# Patient Record
Sex: FEMALE | Race: WHITE | NOT HISPANIC OR LATINO | Employment: FULL TIME | URBAN - METROPOLITAN AREA
[De-identification: names, ages, dates, MRNs, and addresses within clinical notes are randomized per-mention and may not be internally consistent; named-entity substitution may affect disease eponyms.]

---

## 2024-11-04 ENCOUNTER — ULTRASOUND (OUTPATIENT)
Dept: OBGYN CLINIC | Facility: CLINIC | Age: 34
End: 2024-11-04
Payer: COMMERCIAL

## 2024-11-04 VITALS
SYSTOLIC BLOOD PRESSURE: 124 MMHG | HEIGHT: 66 IN | WEIGHT: 206 LBS | BODY MASS INDEX: 33.11 KG/M2 | DIASTOLIC BLOOD PRESSURE: 72 MMHG

## 2024-11-04 DIAGNOSIS — Z32.01 POSITIVE PREGNANCY TEST: Primary | ICD-10-CM

## 2024-11-04 DIAGNOSIS — Z3A.10 10 WEEKS GESTATION OF PREGNANCY: ICD-10-CM

## 2024-11-04 PROCEDURE — 99203 OFFICE O/P NEW LOW 30 MIN: CPT | Performed by: OBSTETRICS & GYNECOLOGY

## 2024-11-04 PROCEDURE — 76817 TRANSVAGINAL US OBSTETRIC: CPT | Performed by: OBSTETRICS & GYNECOLOGY

## 2024-11-04 RX ORDER — ASPIRIN 81 MG/1
81 TABLET, CHEWABLE ORAL DAILY
COMMUNITY

## 2024-11-04 RX ORDER — DIPHENHYDRAMINE HYDROCHLORIDE 25 MG/1
25 CAPSULE ORAL 2 TIMES DAILY
COMMUNITY

## 2024-11-04 RX ORDER — LEVOTHYROXINE SODIUM 125 UG/1
TABLET ORAL
COMMUNITY

## 2024-11-04 NOTE — PROGRESS NOTES
"Pregnancy Confirmation Visit  [facility] - [address], [address], [place] [address]    Assessment/Plan:  34 y.o.  presenting with missed menses.  Viable pregnancy 10w2d by transfer date consistent with ultrasound today.  - Continue/start prenatal vitamin  - We reviewed her current medications and discussed which are safe to continue in pregnancy  - We briefly discussed options for aneuploidy screening, to be discussed further at the prenatal intake  - Schedule prenatal intake with RN and initial prenatal visit; prenatal labs will be ordered during the prenatal intake      Subjective:    CC: Missed period    Azra Mahoney is a 34 y.o.  who presents with missed menses.  No LMP recorded. Patient is pregnant.    Patient notes that this pregnancy was planned and desired.  She was not using contraception at the time of conception. She is an embryo transfer from IVF. She has has no vaginal bleeding since her LMP.    Objective:  /72 (BP Location: Right arm, Patient Position: Sitting, Cuff Size: Standard)   Ht 5' 6\" (1.676 m)   Wt 93.4 kg (206 lb)   BMI 33.25 kg/m²     Physical Exam:  General: Well appearing, no distress  CV: Regular rate  Respiratory: Unlabored breathing  Abdomen: Soft, nontender  Extremities: Without edema  Mood and Affect: Appropriate    Transvaginal Pelvic Ultrasound  [place] IUP  Yolk sac: Present  Fetal Pole: Present  CRL consistent with EGA   Cardiac activity: Present   bpm  No adnexal masses appreciated    "

## 2024-11-13 NOTE — PATIENT INSTRUCTIONS
Sumit Mahoney,     It was so nice getting to know you today. Remember if you have an urgent or time sensitive concern, please call the practice phone number so a clinical triage team member can review your symptoms and get in touch with our on call provider if necessary. If you have general questions or need help navigating our services please REPLY to this message so it comes directly to me and I will respond between other patients. If I am out of the office for any reason, another nurse navigator may reach out to help you. Our Pregnancy Essential Guide is a great online resource--please use the link below.     [facility] Pregnancy [facility]entials [facility]  [facility] ([email or web address])     Again, Congratulations and Thank You for choosing [facility]. I look forward to helping you through this journey.          Congratulations on your pregnancy!  We thank you for allowing us to participate in your care.    NEXT STEPS    Go to the lab to have your prenatal bloodwork competed if you have not already done so.  There is a listing of [facility] and locations in your prenatal folder. You may also visit [email or web address] or call [phone].   Please be aware that some insurance companies may require you to go to a specific lab ([facility] or [email or web address]). You can verify this by contacting your insurance company.   If you have decided to be screened for CF and SMA genetic testing, these tests require prior authorization and scheduling.  Prior Authorization is not a guarantee of payment. There may be out of pocket expenses that includes copays, deductibles and or coinsurance for your individual plan.  Please call [phone] if our team has not contacted you in 7 business days.  Please have your blood work completed prior to you next prenatal visit.    If you have decided to have genetic testing done at Maternal Fetal Medicine, that will be scheduled by [facility]. You may have already scheduled this appointment.  If  not, please call their office to schedule this appointment.  Based on the referral placed by our office, they will know how to schedule you appropriately.    Contact information for Maternal Fetal Medicine is located in your prenatal folder. The main phone number to their office is [phone].     Return to our office for your first routine prenatal visit.     Warning Signs During Pregnancy - If you experience any problems or concerns, call the office directly.  The list below includes warning signs your providers would like you to be aware of.  If you experience any of these at any time during your pregnancy, please call us as soon as possible.    Vaginal bleeding   Sharp abdominal pain that does not go away   Fever (more than 100.4?F and is not relieved with Tylenol)   Persistent vomiting lasting greater than 24 hours   Chest pain/Shortness of breath   Pain or burning when you urinate     Call the OFFICE [phone] for any questions/emergencies.  At night or on the weekend, calls go through a triage service, please indicate it is an emergency and the DOCTOR on call will be paged.    Remember to only use [employer] for non-urgent concerns or questions.    Our doctors deliver at [facility] in [place]. The address is provided below.     [facility]  [address]  [address]     Please click on the links below to review our Pregnancy Essential Guide.    [facility] Pregnancy Essentials Guide  [facility] Women[facility] ([email or web address])     [facility] - Virtual Tour ([employer])      To learn more about the Prenatal Education classes that [facility] offers, click the link below.  Prenatal Education Classes    Click on the link below to review [facility] Lab locations.  [facility] Lab Locations    [employer] resource  [employer] is a tool to connect you to community resources you may need.      Thank you,   Hussain SALAZAR RN  OB Nurse Navigator

## 2024-11-13 NOTE — PROGRESS NOTES
OB INTAKE INTERVIEW  Patient is 34 y.o. who presents for OB intake at 11.5 wks  She is accompanied by herself during this encounter  The father of her baby (Ab Mahoney) is involved in the pregnancy and is 37 years old.      Last Menstrual Period: 2024  Stopped OCP at age 28 and reports her menstrual cycles have been regular, every  30 days.   Ultrasound: showed a GS  5 weeks 2 days on 24  Ultrasound: Measured 7 weeks 1 days on 10/14/24, also noted to have a NUBIA measuring 12 x6x6 mm. Denied vaginal bleeding   Ultrasound: Measured 9 weeks 3 days on 10/28/24  Ultrasound: Measured 10 weeks 2 days on 24  Estimated Date of Delivery: 2025     Signs/Symptoms of Pregnancy  Current pregnancy symptoms:   Nausea: taking Vitamin B6 daily  Constipation no  Headaches no  Cramping/spotting YES, occasional  cramping, denies any spotting.  PICA cravings no    Diabetes-  There is no height or weight on file to calculate BMI.  If patient has 1 or more, please order early 1 hour GTT  History of GDM no  BMI >35 no  History of PCOS or current metformin use YES, not currently on  Metformin. Diagnosed at age 14.  History of LGA/macrosomic infant (4000g/9lbs) no    If patient has 2 or more, please order early 1 hour GTT  BMI>30 YES, BMI 33.25  AMA YES  First degree relative with type 2 diabetes no  History of chronic HTN, hyperlipidemia, elevated A1C no  High risk race (, , ,  or ) no    Hypertension- if you answer yes to any of the following, please order baseline preeclampsia labs (cbc, comprehensive metabolic panel, urine protein creatinine ratio, uric acid)  History of of chronic HTN no  History of gestational HTN no  History of preeclampsia, eclampsia, or HELLP syndrome YES, History of severe Preeclampsia/HELLP syndrome   History of diabetes no  History of lupus, autoimmune disease, kidney disease no    Thyroid- if yes order TSH with reflex  T4  History of thyroid disease YES, Hypothyroidism, currently on Levothyroxine 125 mcg daily, managed by [facility].   Last TSH 10/29/24-2.30    Bleeding Disorder or Hx of DVT-patient or first degree relative with history of. Order the following if not done previously.   (Factor V, antithrombin III, prothrombin gene mutation, protein C and S Ag, lupus anticoagulant, anticardiolipin, beta-2 glycoprotein)   YES, pt reports her father has been hospitalized with Pulmonary Embolism secondary to suspected  Atrial Fibrillation.  Please review and order thrombophilia panel if indicated.     OB/GYN-  History of abnormal pap smear no       Date of last pap smear No current pap on file-Reports she had a pap smear 2023 that was normal   History of HPV no  History of Herpes/HSV no  History of other STI (gonorrhea, chlamydia, trich) no  History of prior  no  History of prior  YES,   History of  delivery prior to 36 weeks 6 days YES, Pre-term  @ 33weeks.   History of blood transfusion no  Ok for blood transfusion  yes    Substance screening-   History of tobacco use no  Currently using tobacco no  Substance Use Screen Level No Risk     MRSA Screening-   Does the pt have a hx of MRSA? no    Immunizations:  Influenza vaccine given this season: Yes, 2024  Discussed Tdap vaccine yes  Discussed COVID Vaccine yes      Genetic/MFM-  Do you or your partner have a history of any of the following in yourselves or first degree relatives?  Cystic fibrosis no  Spinal muscular atrophy no  Hemoglobinopathy/Sickle Cell/Thalassemia no  Fragile X Intellectual Disability no    If yes, discuss Carrier Screening and recommend consultation with MFM/Genetic Counseling and place specific MFM Referral for.    If no, discuss Carrier Screening being completed once in a lifetime as a standard of care lab test. Place orders for Cystic Fibrosis Gene Test (NVD277) and Spinal Muscular Atrophy DNA  ([id number])      Appointment for Nuchal Translucency Ultrasound at [facility] scheduled for 11/25/24  *Reports she had carrier screening done with [facility]  was a carier for (Renal) disorder, pt was negative. No records were available at time of Intake. Pt will try to obtain a copy of results.   *Reports she had PGT testing on the embryo that was normal.    Interview education  [facility] Pregnancy Essentials Book reviewed, discussed and attached to their AVS yes    Nurse/Family Partnership- patient may qualify no  Ambulatory Referral to  placed  EPDS: 7    Prenatal lab work scripts YES  Preferred Lab: [facility]  Extra labs ordered:  Early 1 hour gtt   Uric Acid level    10/3/24-  *Baseline CMP, CBC, Urine prot/creat ration-All WNL, results are on the chart. Uric acid level not done (orders placed)   10/3/24-Hgb AIC-5.2   10/29/24-  TSH-2.3  T4 1.23    Aspirin/Preeclampsia Screen    Risk Level Risk Factor Recommendation   LOW Prior Uncomplicated full-term delivery no No Aspirin recommendation        MODERATE Nulliparity no Recommend low-dose aspirin if     BMI>30 YES 2 or more moderate risk factors    Family History Preeclampsia (mother/sister) YES, referred to as toxemia      35yr old or greater YES     Black Race, Concern for SDOH/Low Socioeconomic no     IVF Pregnancy  YES     Personal History Risks (low birth weight, prior adverse preg outcome, >10yr preg interval) YES         HIGH History of Preeclampsia YES, HELLP Syndrome Recommend low-dose aspirin if     Multifetal gestation no 1 or more high risk factors    Chronic HTN no     Type 1 or 2 Diabetes no     Renal Disease no     Autoimmune Disease  no      Contraindications to ASA therapy:  NSAID/ ASA allergy: no  Nasal polyps: no  Asthma with history of ASA induced bronchospasm: no  Relative contraindications:  History of GI bleed: no  Active peptic ulcer disease: no  Severe hepatic dysfunction: no    Patient should be recommended to take ASA 162mg  during this pregnancy from 12-36wks to lower her risk of preeclampsia:   High Risk -  Currently on 81 mg daily. Pt should be recommended to take ASA 162mg during this pregnancy from 12-36wks  Pt informed Dr. Nick discussed increasing ASA to 162 mg at 12 weeks   The patient has a history now or in prior pregnancy notable for:  See Below       Details that I feel the provider should be aware of:   Azra is a new patient to [facility].This is her third pregnancy. She is a transfer of care from [facility] after undergoing an  6 day Embryo transfer with IVF on 24.  Her pregnancy history includes on Pre-term delivery @ 33w via  section for severe preeclampsia and HELLP syndrome, IUGR and a miscarriage in 2023. Did not require surgical intervention.   Saw MFM with [facility] prior to IVF transfer, had to be cleared secondary to HELLP syndrome in prior pregnancy. baseline Pre-eclampsia labs done on 10/03/24, Uric acid not done, ordered with initial PN labs.    Delivery@ 33 weeks  LTCS  Preeclampsia and HELLP syndrome  AMA  History of PCOS Diagnosed at age 14, underwent Laparoscopy 2022-Confirmed PCOS and underwent Ovarian drilling.   History of hypothyroidism-Currently on Levothyroxine, Had been managed by [facility] during IVF cycle. TSH 10/29/24-2.30-will need a new order to have checked second trimester   Female infertility -transfer from [facility] post IVF  Family history of Pulmonary Embolism in father-please review if Thrombophilia panel is indicated      PN1 visit scheduled. The patient was oriented to our practice, the navigator role, reviewed delivering physicians and [facility]. All questions were answered.    Interviewed by: SILVESTRE Mckeon RN

## 2024-11-14 ENCOUNTER — INITIAL PRENATAL (OUTPATIENT)
Dept: OBGYN CLINIC | Facility: CLINIC | Age: 34
End: 2024-11-14

## 2024-11-14 VITALS
HEIGHT: 66 IN | WEIGHT: 206 LBS | DIASTOLIC BLOOD PRESSURE: 80 MMHG | BODY MASS INDEX: 33.11 KG/M2 | SYSTOLIC BLOOD PRESSURE: 120 MMHG

## 2024-11-14 DIAGNOSIS — E03.9 HYPOTHYROIDISM AFFECTING PREGNANCY IN FIRST TRIMESTER: ICD-10-CM

## 2024-11-14 DIAGNOSIS — O09.299 HX OF PREECLAMPSIA, PRIOR PREGNANCY, CURRENTLY PREGNANT: ICD-10-CM

## 2024-11-14 DIAGNOSIS — O99.281 HYPOTHYROIDISM AFFECTING PREGNANCY IN FIRST TRIMESTER: ICD-10-CM

## 2024-11-14 DIAGNOSIS — O09.299 H/O HELLP SYNDROME, CURRENTLY PREGNANT: ICD-10-CM

## 2024-11-14 DIAGNOSIS — Z36.89 ENCOUNTER FOR OTHER SPECIFIED ANTENATAL SCREENING: Primary | ICD-10-CM

## 2024-11-14 DIAGNOSIS — O99.211 OTHER OBESITY DUE TO EXCESS CALORIES AFFECTING PREGNANCY IN FIRST TRIMESTER: ICD-10-CM

## 2024-11-14 DIAGNOSIS — E66.09 OTHER OBESITY DUE TO EXCESS CALORIES AFFECTING PREGNANCY IN FIRST TRIMESTER: ICD-10-CM

## 2024-11-14 DIAGNOSIS — Z87.42 HISTORY OF PCOS: ICD-10-CM

## 2024-11-14 DIAGNOSIS — Z3A.11 11 WEEKS GESTATION OF PREGNANCY: ICD-10-CM

## 2024-11-18 ENCOUNTER — PATIENT OUTREACH (OUTPATIENT)
Dept: OBGYN CLINIC | Facility: CLINIC | Age: 34
End: 2024-11-18

## 2024-11-18 NOTE — PROGRESS NOTES
SW referred for initial prenatal assessment. Patient is , [id number] with an JULIANNE of 25. Patient agreeable to speaking with SW by phone today.    Patient reports this is a planned pregnancy (IVF). She and FOB ( Don) both work and drive. Patient denies needing information for MA/WIC/SNAP, parenting education, or baby supply resources today. She expressed interest in ACP info - [employer] sent via email.     Patient denies current or h/o substance use, DV/IPV, and legal concerns. Reports h/o CYS involvement related to her YURIY's kids - patient and FOB were listed on safety plan as support family members/kinship care options if YURIY's children needed to be removed from the home. Denies CYS involvement related to her own children.     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 Sees a therapist 1-2x/month at a maternal wellness center. Not currently on any mental health-related medication. Has felt anxious this pregnancy given h/o miscarriage, but states she feels physically better than she did in her last pregnancy. Has been taking extra time to rest when she needs to, and feels she is managing well. Has good support from FOB, family, and therapist. Enjoys napping, reading, and massages for stress relief.    No other SW needs identified at this time, SW closing referral. Please re-refer as needed.

## 2024-11-23 LAB
EXTERNAL ANTIBODY SCREEN: NORMAL
EXTERNAL HEMOGLOBIN: 13.2 G/DL
EXTERNAL HEPATITIS B SURFACE ANTIGEN: NEGATIVE
EXTERNAL HIV-1 AB: NEGATIVE
EXTERNAL HIV-1 P24 ANTIGEN: NEGATIVE
EXTERNAL HIV-2 AB: NEGATIVE
EXTERNAL PLATELET COUNT: 231 K/ÂΜL
EXTERNAL RH FACTOR: POSITIVE
EXTERNAL RUBELLA IGG QUANTITATION: NORMAL
EXTERNAL SYPHILIS TOTAL IGG/IGM SCREENING: NEGATIVE

## 2024-11-24 LAB
BASOPHILS # BLD AUTO: 18 CELLS/UL (ref 0–200)
BASOPHILS NFR BLD AUTO: 0.3 %
EOSINOPHIL # BLD AUTO: 79 CELLS/UL (ref 15–500)
EOSINOPHIL NFR BLD AUTO: 1.3 %
ERYTHROCYTE [DISTWIDTH] IN BLOOD BY AUTOMATED COUNT: 12.9 % (ref 11–15)
GLUCOSE 1H P 50 G GLC PO SERPL-MCNC: 57 MG/DL
HBV SURFACE AG SERPL QL IA: NORMAL
HCT VFR BLD AUTO: 39.5 % (ref 35–45)
HCV AB SERPL QL IA: NORMAL
HGB BLD-MCNC: 13.2 G/DL (ref 11.7–15.5)
HIV 1+2 AB+HIV1 P24 AG SERPL QL IA: NORMAL
LYMPHOCYTES # BLD AUTO: 1488 CELLS/UL (ref 850–3900)
LYMPHOCYTES NFR BLD AUTO: 24.4 %
MCH RBC QN AUTO: 29.8 PG (ref 27–33)
MCHC RBC AUTO-ENTMCNC: 33.4 G/DL (ref 32–36)
MCV RBC AUTO: 89.2 FL (ref 80–100)
MONOCYTES # BLD AUTO: 482 CELLS/UL (ref 200–950)
MONOCYTES NFR BLD AUTO: 7.9 %
NEUTROPHILS # BLD AUTO: 4032 CELLS/UL (ref 1500–7800)
NEUTROPHILS NFR BLD AUTO: 66.1 %
PLATELET # BLD AUTO: 231 THOUSAND/UL (ref 140–400)
PMV BLD REES-ECKER: 11.7 FL (ref 7.5–12.5)
RBC # BLD AUTO: 4.43 MILLION/UL (ref 3.8–5.1)
RUBV IGG SERPL IA-ACNC: 3.04 INDEX
URATE SERPL-MCNC: 4.5 MG/DL (ref 2.5–7)
WBC # BLD AUTO: 6.1 THOUSAND/UL (ref 3.8–10.8)

## 2024-11-25 ENCOUNTER — TELEPHONE (OUTPATIENT)
Age: 34
End: 2024-11-25

## 2024-11-25 ENCOUNTER — ROUTINE PRENATAL (OUTPATIENT)
Dept: PERINATAL CARE | Facility: CLINIC | Age: 34
End: 2024-11-25
Payer: COMMERCIAL

## 2024-11-25 VITALS
WEIGHT: 209.6 LBS | HEIGHT: 66 IN | SYSTOLIC BLOOD PRESSURE: 122 MMHG | HEART RATE: 98 BPM | BODY MASS INDEX: 33.68 KG/M2 | OXYGEN SATURATION: 99 % | DIASTOLIC BLOOD PRESSURE: 80 MMHG

## 2024-11-25 DIAGNOSIS — O09.299 HISTORY OF HELLP SYNDROME, CURRENTLY PREGNANT: ICD-10-CM

## 2024-11-25 DIAGNOSIS — O99.281 HYPOTHYROIDISM AFFECTING PREGNANCY IN FIRST TRIMESTER: ICD-10-CM

## 2024-11-25 DIAGNOSIS — Z82.49 FAMILY HISTORY OF THROMBOSIS: ICD-10-CM

## 2024-11-25 DIAGNOSIS — O09.811 PREGNANCY RESULTING FROM ASSISTED REPRODUCTIVE TECHNOLOGY IN FIRST TRIMESTER: ICD-10-CM

## 2024-11-25 DIAGNOSIS — Z98.891 HISTORY OF CESAREAN SECTION: ICD-10-CM

## 2024-11-25 DIAGNOSIS — E03.9 HYPOTHYROIDISM AFFECTING PREGNANCY IN FIRST TRIMESTER: ICD-10-CM

## 2024-11-25 DIAGNOSIS — E66.811 OBESITY, CLASS I, BMI 30-34.9: ICD-10-CM

## 2024-11-25 DIAGNOSIS — Z3A.13 13 WEEKS GESTATION OF PREGNANCY: ICD-10-CM

## 2024-11-25 DIAGNOSIS — Z36.82 NUCHAL TRANSLUCENCY OF FETUS ON PRENATAL ULTRASOUND: Primary | ICD-10-CM

## 2024-11-25 DIAGNOSIS — Z36.0 ENCOUNTER FOR ANTENATAL SCREENING FOR CHROMOSOMAL ANOMALIES: ICD-10-CM

## 2024-11-25 DIAGNOSIS — O09.521 ADVANCED MATERNAL AGE IN MULTIGRAVIDA, FIRST TRIMESTER: ICD-10-CM

## 2024-11-25 PROCEDURE — 76813 OB US NUCHAL MEAS 1 GEST: CPT | Performed by: OBSTETRICS & GYNECOLOGY

## 2024-11-25 PROCEDURE — 76801 OB US < 14 WKS SINGLE FETUS: CPT | Performed by: OBSTETRICS & GYNECOLOGY

## 2024-11-25 PROCEDURE — 99244 OFF/OP CNSLTJ NEW/EST MOD 40: CPT | Performed by: OBSTETRICS & GYNECOLOGY

## 2024-11-25 NOTE — TELEPHONE ENCOUNTER
Patient calling in stating taht she's 13w2d , pt had blood work completed and pt is concerned about her glucose testing result. Pt would like recommendations. pt was notified that the provider hasn't yet reviewed the results, once the provider reviews the results and gives further information, the patient will be notified, pt verbalized understanding, no further questions at this time.

## 2024-11-25 NOTE — PROGRESS NOTES
A fetal ultrasound was completed. See Ob procedures in Epic for an interpretation and recommendations. Do not hesitate to contact us in [facility] if you have questions.    Horace Morelos MD, MSCE  Maternal Fetal Medicine

## 2024-11-25 NOTE — LETTER
November 25, 2024     Maggie Nick V,   [address]  [address]  [place] [address]    Patient: Azra Mahoney   YOB: 1990   Date of Visit: 11/25/2024       Dear Dr. Nick:    Thank you for referring Azra Mahoney to me for evaluation. Below are my notes for this consultation.    If you have questions, please do not hesitate to call me. I look forward to following your patient along with you.         Sincerely,        Horace Morelos MD        CC: No Recipients    Horace Morelos MD  11/25/2024  4:07 PM  Sign when Signing Visit  A fetal ultrasound was completed. See Ob procedures in Epic for an interpretation and recommendations. Do not hesitate to contact us in [facility] if you have questions.    Horace Morelos MD, MSCE  Maternal Fetal Medicine

## 2024-11-25 NOTE — PROGRESS NOTES
Ultrasound Probe Disinfection    A transvaginal ultrasound was performed.   Prior to use, disinfection was performed with High Level Disinfection Process ([employer]).  Probe serial number [id number] was used.    Yessica Bucio  11/25/24  1:41 PM

## 2024-11-26 LAB
ABO GROUP BLD: NORMAL
BASOPHILS # BLD AUTO: 18 CELLS/UL (ref 0–200)
BASOPHILS NFR BLD AUTO: 0.3 %
BLD GP AB SCN SERPL QL: NORMAL
EOSINOPHIL # BLD AUTO: 79 CELLS/UL (ref 15–500)
EOSINOPHIL NFR BLD AUTO: 1.3 %
ERYTHROCYTE [DISTWIDTH] IN BLOOD BY AUTOMATED COUNT: 12.9 % (ref 11–15)
GLUCOSE 1H P 50 G GLC PO SERPL-MCNC: 57 MG/DL
HBV SURFACE AG SERPL QL IA: NORMAL
HCT VFR BLD AUTO: 39.5 % (ref 35–45)
HCV AB SERPL QL IA: NORMAL
HGB BLD-MCNC: 13.2 G/DL (ref 11.7–15.5)
HIV 1+2 AB+HIV1 P24 AG SERPL QL IA: NORMAL
LYMPHOCYTES # BLD AUTO: 1488 CELLS/UL (ref 850–3900)
LYMPHOCYTES NFR BLD AUTO: 24.4 %
MCH RBC QN AUTO: 29.8 PG (ref 27–33)
MCHC RBC AUTO-ENTMCNC: 33.4 G/DL (ref 32–36)
MCV RBC AUTO: 89.2 FL (ref 80–100)
MONOCYTES # BLD AUTO: 482 CELLS/UL (ref 200–950)
MONOCYTES NFR BLD AUTO: 7.9 %
NEUTROPHILS # BLD AUTO: 4032 CELLS/UL (ref 1500–7800)
NEUTROPHILS NFR BLD AUTO: 66.1 %
PLATELET # BLD AUTO: 231 THOUSAND/UL (ref 140–400)
PMV BLD REES-ECKER: 11.7 FL (ref 7.5–12.5)
RBC # BLD AUTO: 4.43 MILLION/UL (ref 3.8–5.1)
RH BLD: NORMAL
RPR SER QL: NORMAL
RUBV IGG SERPL IA-ACNC: 3.04 INDEX
URATE SERPL-MCNC: 4.5 MG/DL (ref 2.5–7)
WBC # BLD AUTO: 6.1 THOUSAND/UL (ref 3.8–10.8)

## 2024-12-02 ENCOUNTER — INITIAL PRENATAL (OUTPATIENT)
Dept: OBGYN CLINIC | Facility: CLINIC | Age: 34
End: 2024-12-02

## 2024-12-02 VITALS
HEIGHT: 66 IN | DIASTOLIC BLOOD PRESSURE: 82 MMHG | BODY MASS INDEX: 33.65 KG/M2 | SYSTOLIC BLOOD PRESSURE: 118 MMHG | WEIGHT: 209.4 LBS

## 2024-12-02 DIAGNOSIS — E03.9 HYPOTHYROIDISM AFFECTING PREGNANCY IN SECOND TRIMESTER: ICD-10-CM

## 2024-12-02 DIAGNOSIS — O34.211 MATERNAL CARE DUE TO LOW TRANSVERSE UTERINE SCAR FROM PREVIOUS CESAREAN DELIVERY: ICD-10-CM

## 2024-12-02 DIAGNOSIS — Z12.4 SCREENING FOR MALIGNANT NEOPLASM OF THE CERVIX: ICD-10-CM

## 2024-12-02 DIAGNOSIS — O09.812 PREGNANCY RESULTING FROM ASSISTED REPRODUCTIVE TECHNOLOGY IN SECOND TRIMESTER: Primary | ICD-10-CM

## 2024-12-02 DIAGNOSIS — Z3A.14 14 WEEKS GESTATION OF PREGNANCY: ICD-10-CM

## 2024-12-02 DIAGNOSIS — O99.282 HYPOTHYROIDISM AFFECTING PREGNANCY IN SECOND TRIMESTER: ICD-10-CM

## 2024-12-02 DIAGNOSIS — O09.299 HISTORY OF HELLP SYNDROME, CURRENTLY PREGNANT: ICD-10-CM

## 2024-12-02 LAB
EXTERNAL CHLAMYDIA SCREEN: NEGATIVE
EXTERNAL GONORRHEA SCREEN: NEGATIVE

## 2024-12-02 PROCEDURE — PNV: Performed by: OBSTETRICS & GYNECOLOGY

## 2024-12-02 NOTE — PROGRESS NOTES
"Routine Prenatal Visit  [facility] - [place] [id number] [place] [address]    Assessment/Plan:  Azra is a 34 y.o. year old  at 14w2d who presents for routine prenatal visit.     1. Pregnancy resulting from assisted reproductive technology in second trimester  2. Maternal care due to low transverse uterine scar from previous  delivery  3. History of HELLP syndrome, currently pregnant  4. Hypothyroidism affecting pregnancy in second trimester  5. 14 weeks gestation of pregnancy  6. Screening for malignant neoplasm of the cervix  -     Thinprep Tis Pap and HPV mRNA E6/E7, Chlamydia/N.gonorrhoeae        Subjective:     CC: Prenatal care    Azra Mahoney is a 34 y.o.  female who presents for routine prenatal care at 14w2d.  Pregnancy ROS: no leakage of fluid, pelvic pain, or vaginal bleeding.  no fetal movement.    The following portions of the patient's history were reviewed and updated as appropriate: allergies, current medications, past family history, past medical history, obstetric history, gynecologic history, past social history, past surgical history and problem list.      Objective:  /82 (BP Location: Left arm, Patient Position: Sitting, Cuff Size: Standard)   Ht 5' 6\" (1.676 m)   Wt 95 kg (209 lb 6.4 oz)   BMI 33.80 kg/m²   Pregravid Weight/BMI: 90.7 kg (200 lb) (BMI 32.30)  Current Weight: 95 kg (209 lb 6.4 oz)   Total Weight Gain: 4.264 kg (9 lb 6.4 oz)   Pre-Kit Vitals      Flowsheet Row Most Recent Value   Prenatal Assessment    Fetal Heart Rate 160   Fundal Height (cm) 14 cm   Prenatal Vitals    Blood Pressure 118/82   Weight - Scale 95 kg (209 lb 6.4 oz)   Urine Albumin/Glucose    Dilation/Effacement/Station    Vaginal Drainage    Edema              General: Well appearing, no distress  Respiratory: Unlabored breathing  Cardiovascular: Regular rate.  Abdomen: Soft, gravid, nontender  Fundal Height: Appropriate for gestational age.  Extremities: Warm and " well perfused.  Non tender.

## 2024-12-04 PROBLEM — O09.812 PREGNANCY RESULTING FROM ASSISTED REPRODUCTIVE TECHNOLOGY IN SECOND TRIMESTER: Status: ACTIVE | Noted: 2024-11-25

## 2024-12-05 LAB
C TRACH RRNA SPEC QL NAA+PROBE: NOT DETECTED
CLINICAL INFO: NORMAL
CYTO CVX: NORMAL
CYTOLOGY CMNT CVX/VAG CYTO-IMP: NORMAL
DATE PREVIOUS BX: NORMAL
HPV E6+E7 MRNA CVX QL NAA+PROBE: NOT DETECTED
LMP START DATE: NORMAL
N GONORRHOEA RRNA SPEC QL NAA+PROBE: NOT DETECTED
SL AMB PREV. PAP:: NORMAL
SPECIMEN SOURCE CVX/VAG CYTO: NORMAL

## 2024-12-11 PROBLEM — O34.211 MATERNAL CARE DUE TO LOW TRANSVERSE UTERINE SCAR FROM PREVIOUS CESAREAN DELIVERY: Status: ACTIVE | Noted: 2024-12-11

## 2024-12-11 PROBLEM — O99.282 HYPOTHYROIDISM AFFECTING PREGNANCY IN SECOND TRIMESTER: Status: ACTIVE | Noted: 2024-11-25

## 2024-12-24 ENCOUNTER — RESULTS FOLLOW-UP (OUTPATIENT)
Dept: OBGYN CLINIC | Facility: CLINIC | Age: 34
End: 2024-12-24

## 2024-12-30 ENCOUNTER — ROUTINE PRENATAL (OUTPATIENT)
Dept: OBGYN CLINIC | Facility: CLINIC | Age: 34
End: 2024-12-30
Payer: COMMERCIAL

## 2024-12-30 ENCOUNTER — TELEPHONE (OUTPATIENT)
Dept: OBGYN CLINIC | Facility: CLINIC | Age: 34
End: 2024-12-30

## 2024-12-30 VITALS — SYSTOLIC BLOOD PRESSURE: 120 MMHG | DIASTOLIC BLOOD PRESSURE: 70 MMHG | BODY MASS INDEX: 34.48 KG/M2 | WEIGHT: 213.6 LBS

## 2024-12-30 DIAGNOSIS — O99.282 HYPOTHYROIDISM AFFECTING PREGNANCY IN SECOND TRIMESTER: ICD-10-CM

## 2024-12-30 DIAGNOSIS — Z36.1 ANTENATAL SCREENING FOR RAISED ALPHAFETOPROTEIN LEVEL: ICD-10-CM

## 2024-12-30 DIAGNOSIS — E66.09 OTHER OBESITY DUE TO EXCESS CALORIES AFFECTING PREGNANCY IN SECOND TRIMESTER: ICD-10-CM

## 2024-12-30 DIAGNOSIS — Z3A.18 18 WEEKS GESTATION OF PREGNANCY: Primary | ICD-10-CM

## 2024-12-30 DIAGNOSIS — E03.9 HYPOTHYROIDISM AFFECTING PREGNANCY IN SECOND TRIMESTER: ICD-10-CM

## 2024-12-30 DIAGNOSIS — O34.211 MATERNAL CARE DUE TO LOW TRANSVERSE UTERINE SCAR FROM PREVIOUS CESAREAN DELIVERY: ICD-10-CM

## 2024-12-30 DIAGNOSIS — O99.212 OTHER OBESITY DUE TO EXCESS CALORIES AFFECTING PREGNANCY IN SECOND TRIMESTER: ICD-10-CM

## 2024-12-30 DIAGNOSIS — O09.299 HISTORY OF HELLP SYNDROME, CURRENTLY PREGNANT: ICD-10-CM

## 2024-12-30 DIAGNOSIS — Z36.89 ENCOUNTER FOR OTHER SPECIFIED ANTENATAL SCREENING: ICD-10-CM

## 2024-12-30 DIAGNOSIS — O09.522 MULTIGRAVIDA OF ADVANCED MATERNAL AGE IN SECOND TRIMESTER: ICD-10-CM

## 2024-12-30 DIAGNOSIS — O09.812 PREGNANCY RESULTING FROM IN VITRO FERTILIZATION IN SECOND TRIMESTER: ICD-10-CM

## 2024-12-30 DIAGNOSIS — O09.299 HISTORY OF INTRAUTERINE GROWTH RESTRICTION IN PRIOR PREGNANCY, CURRENTLY PREGNANT: ICD-10-CM

## 2024-12-30 PROBLEM — O99.280 HYPOTHYROIDISM COMPLICATING PREGNANCY: Status: ACTIVE | Noted: 2024-11-25

## 2024-12-30 PROBLEM — O09.529 ADVANCED MATERNAL AGE IN MULTIGRAVIDA: Status: ACTIVE | Noted: 2024-11-25

## 2024-12-30 PROBLEM — O09.819 PREGNANCY RESULTING FROM ASSISTED REPRODUCTIVE TECHNOLOGY: Status: ACTIVE | Noted: 2024-11-25

## 2024-12-30 PROBLEM — O99.210 OBESITY AFFECTING PREGNANCY: Status: ACTIVE | Noted: 2024-12-30

## 2024-12-30 LAB
SL AMB  POCT GLUCOSE, UA: NEGATIVE
SL AMB POCT URINE PROTEIN: NEGATIVE

## 2024-12-30 PROCEDURE — PNV: Performed by: STUDENT IN AN ORGANIZED HEALTH CARE EDUCATION/TRAINING PROGRAM

## 2024-12-30 PROCEDURE — 81002 URINALYSIS NONAUTO W/O SCOPE: CPT | Performed by: STUDENT IN AN ORGANIZED HEALTH CARE EDUCATION/TRAINING PROGRAM

## 2024-12-30 NOTE — PROGRESS NOTES
Routine Prenatal Visit  [facility] - [place]  [address], [place] [address]  Assessment & Plan  Pregnancy resulting from in vitro fertilization in second trimester  - Plan for fetal echo, 3rd trimester growth US, and weekly APFS beginning at 36 weeks.   - Contine  mg PO daily until 36 weeks for pre-eclampsia risk reduction.        Maternal care due to low transverse uterine scar from previous  delivery  - Discussed mode of delivery. Patient is undecided. She indicates that this is most likely her final pregnancy. Will continue to consider and discuss at subsequent visit.        Hypothyroidism affecting pregnancy in second trimester  - Most recent TSH . Continue levothyroxine as managed by [facility].   - Discussed recommendation for serial TSH every 6 weeks. Patient to send results via [employer].        History of HELLP syndrome, currently pregnant  - Contine  mg PO daily until 36 weeks for pre-eclampsia risk reduction.          History of intrauterine growth restriction in prior pregnancy, currently pregnant  - Plan for serial growth ultrasound.       18 weeks gestation of pregnancy  - Continue prenatal vitamin with folic acid.  - Aneuploidy screening: PGS negative. Declines NIPS.  - Screening for open neural tube defects reviewed. Order for maternal serum alpha-fetoprotein provided today.  - Level II anatomy ultrasound scheduled with Maternal Fetal Medicine.  - Problem list updated, results console reviewed and updated with pertinent prenatal labs.  - PMH, PSH, medications reviewed and updated as needed.  - Return to office in 4 wk(s) for routine prenatal care.    Orders:  •  POCT urine dip    Multigravida of advanced maternal age in second trimester         Other obesity due to excess calories affecting pregnancy in second trimester          screening for raised alphafetoprotein level    Orders:  •  Alpha fetoprotein, maternal; Future    Encounter for  other specified  screening    Orders:  •  Alpha fetoprotein, maternal; Future    Subjective:   Azra Mahoney is a 34 y.o.  who presents for routine prenatal care at 18w2d.  Complaints today: None  LOF: No; VB: No; Contractions: No; FM: Flutters    Objective:  /70   Wt 96.9 kg (213 lb 9.6 oz)   BMI 34.48 kg/m²     General: Well appearing, no distress  Respiratory: Unlabored breathing  Cardiovascular: Regular rate.  Abdomen: Soft, gravid, nontender  Extremities: Warm and well perfused.  Non tender.    Pregravid Weight/BMI: 90.7 kg (200 lb) (BMI 32.30)  Current Weight: 96.9 kg (213 lb 9.6 oz)   Total Weight Gain: 6.169 kg (13 lb 9.6 oz)     Pre- Vitals    Flowsheet Row Most Recent Value   Prenatal Assessment    Fetal Heart Rate 150   Fundal Height (cm) 18 cm   Movement Present   Prenatal Vitals    Blood Pressure 120/70   Weight - Scale 96.9 kg (213 lb 9.6 oz)   Urine Albumin/Glucose    Dilation/Effacement/Station    Vaginal Drainage    Draining Fluid No   Edema    LLE Edema None   RLE Edema None   Facial Edema None           Chacorta Lewis MD  2024 4:05 PM

## 2024-12-30 NOTE — ASSESSMENT & PLAN NOTE
- Continue prenatal vitamin with folic acid.  - Aneuploidy screening: PGS negative. Declines NIPS.  - Screening for open neural tube defects reviewed. Order for maternal serum alpha-fetoprotein provided today.  - Level II anatomy ultrasound scheduled with Maternal Fetal Medicine.  - Problem list updated, results console reviewed and updated with pertinent prenatal labs.  - PMH, PSH, medications reviewed and updated as needed.  - Return to office in 4 wk(s) for routine prenatal care.    Orders:  •  POCT urine dip

## 2024-12-30 NOTE — ASSESSMENT & PLAN NOTE
- Most recent TSH . Continue levothyroxine as managed by [facility].   - Discussed recommendation for serial TSH every 6 weeks. Patient to send results via [employer].

## 2024-12-30 NOTE — ASSESSMENT & PLAN NOTE
- Plan for fetal echo, 3rd trimester growth US, and weekly APFS beginning at 36 weeks.   - Contine  mg PO daily until 36 weeks for pre-eclampsia risk reduction.

## 2024-12-30 NOTE — ASSESSMENT & PLAN NOTE
- Discussed mode of delivery. Patient is undecided. She indicates that this is most likely her final pregnancy. Will continue to consider and discuss at subsequent visit.

## 2024-12-30 NOTE — TELEPHONE ENCOUNTER
Second Trimester Calls:    Overall how are you doing? Overall doing well.     Compliant with routine OB care appointments? Yes    Have you completed your 1st trimester labs? Yes    If you had NIPS with MFM, do you have a order for MSAFP? Order will be provided today at appointment 12/30/24   Can be completed 15w-22w6d, ideally 16w-18w    Have you seen MFM and do you have your detailed US scheduled? Scheduled 1/20/25    Pregnancy Education-have you had a chance to review the classes offered and registered? Pt states she did not had a chance to look at the classes offered yet but will look into it.

## 2025-01-06 ENCOUNTER — NURSE TRIAGE (OUTPATIENT)
Dept: OTHER | Facility: OTHER | Age: 35
End: 2025-01-06

## 2025-01-07 ENCOUNTER — TELEPHONE (OUTPATIENT)
Dept: OBGYN CLINIC | Facility: CLINIC | Age: 35
End: 2025-01-07

## 2025-01-07 NOTE — TELEPHONE ENCOUNTER
"Regardin wks preg / cold symptoms / chills / temp 101 / body aches  ----- Message from Benita CESPEDES sent at 2025  7:08 PM EST -----  \"I am 19 weeks pregnant and woke this morning with cold symptoms like sore throat and stuffy, but now I have the chills and body aches all over and a temp of 101. I don't know if I should just take Tylenol\"    "

## 2025-01-07 NOTE — TELEPHONE ENCOUNTER
"Reason for Disposition  • Fever 100.4 F (38.0 C) or higher  • Common cold with no complications    Answer Assessment - Initial Assessment Questions  1. TEMPERATURE: \"What is the most recent temperature?\"  \"How was it measured?\"       101 (tympanic) fever started today in the last 10 minutes    2. ONSET: \"When did the fever start?\"       Today since this morning.     3. CHILLS: \"Do you have chills?\" If Yes, ask: \"How bad are they?\"  (e.g., none, mild, moderate, severe)      No chills.     4. CAUSE: If there are no symptoms, ask: \"What do you think is causing the fever?\"       Cold    5. CONTACTS: \"Does anyone else in the family have an infection?\"      Pts son is getting over being sick with URI.     6. TREATMENT: \"What have you done so far to treat this fever?\" (e.g., medicines)      Took 500 mg of Tylenol LD: took about 6 hrs ago.     7. IMMUNOCOMPROMISE: \"Do you have of the following: diabetes, HIV positive, splenectomy, chronic steroid treatment, transplant patient, etc.\"      No    8. OTHER SYMPTOMS: \"Do you have any other symptoms?\" (e.g., abdomen pain, cough, decreased fetal movement, diarrhea, headache, leaking fluid or concern water broke, sore throat, urination pain, vaginal bleeding)      Stuffy nose, sore throat, headache, chills and shivering. Mild occasional cough. Entire body hurts. No belly pain, vaginal bleeding, or fluid leakage, no change in FM.      9. JULIANNE: \"What date are you expecting to deliver?      5/31/25    10. TRAVEL: \"Have you traveled out of the country in the last month?\" (e.g., travel history, exposures)        No recent travel.    Protocols used: Pregnancy - Fever-Adult-AH, Common Cold-Adult-AH    "

## 2025-01-07 NOTE — TELEPHONE ENCOUNTER
OB - 19 wks 3 days - started with sore throat & congestion on 1/6/2024 - had called in last night thru answering service - had temp 101.0, body aches.  Was taking Tylenol 500 mg q 4 hrs from 7 pm last night until 10 am this morning.  (+) Covid test.  Temp now 100.1, feels some improvement of symptoms today.  Denies SOB.  Spoke with SELENA Bauer re: patient's sx - recommend to continue Tylenol (not to exceed 3000 mg/day), increase hydration, recall office in 1-2 days to update or prn if worsening sx.  Patient informed.

## 2025-01-09 ENCOUNTER — PATIENT MESSAGE (OUTPATIENT)
Dept: OBGYN CLINIC | Facility: CLINIC | Age: 35
End: 2025-01-09

## 2025-01-09 DIAGNOSIS — E03.9 HYPOTHYROIDISM AFFECTING PREGNANCY IN SECOND TRIMESTER: Primary | ICD-10-CM

## 2025-01-09 DIAGNOSIS — O99.282 HYPOTHYROIDISM AFFECTING PREGNANCY IN SECOND TRIMESTER: Primary | ICD-10-CM

## 2025-01-10 NOTE — TELEPHONE ENCOUNTER
Follow-up call placed, voicemail received, left a message inquiring how she is feeling, call back if symptoms have not improved or need for follow-up by PCP.

## 2025-01-11 ENCOUNTER — RESULTS FOLLOW-UP (OUTPATIENT)
Dept: LABOR AND DELIVERY | Facility: HOSPITAL | Age: 35
End: 2025-01-11

## 2025-01-11 LAB — TSH SERPL-ACNC: 2.4 MIU/L

## 2025-01-13 ENCOUNTER — RESULTS FOLLOW-UP (OUTPATIENT)
Dept: OBGYN CLINIC | Facility: CLINIC | Age: 35
End: 2025-01-13

## 2025-01-13 LAB
# FETUSES US: 1
AFP ADJ MOM SERPL: 0.92
AFP INTERP SERPL-IMP: NORMAL
AFP SERPL-MCNC: 42.4 NG/ML
AGE: NORMAL
DONATED EGG PATIENT QL: NO
GA CLIN EST: 19.9 WEEKS
GA METHOD: NORMAL
HX OF NTD NARR: NO
HX OF TRISOMY 21 NARR: NO
IDDM PATIENT QL: NO
NEURAL TUBE DEFECT RISK FETUS: NORMAL %
SL AMB REPEAT SPECIMEN: NO

## 2025-01-20 ENCOUNTER — TELEPHONE (OUTPATIENT)
Age: 35
End: 2025-01-20

## 2025-01-20 ENCOUNTER — TELEPHONE (OUTPATIENT)
Facility: HOSPITAL | Age: 35
End: 2025-01-20

## 2025-01-20 NOTE — TELEPHONE ENCOUNTER
Pt unaware the office was opening late until this morning, almost to appt. Advised she would be contacted to nury. No appts avail and no one in office until later today.

## 2025-01-20 NOTE — TELEPHONE ENCOUNTER
Left voicemail requesting patient call our office back at [phone] so that we may reschedule her ultrasound appointment. Informed patient she may ask for me when calling back.

## 2025-01-23 NOTE — PROGRESS NOTES
Please refer to the [facility] ultrasound report in Ob Procedures for additional information regarding today's visit

## 2025-01-25 ENCOUNTER — ROUTINE PRENATAL (OUTPATIENT)
Facility: HOSPITAL | Age: 35
End: 2025-01-25
Attending: OBSTETRICS & GYNECOLOGY
Payer: COMMERCIAL

## 2025-01-25 VITALS
HEIGHT: 66 IN | HEART RATE: 93 BPM | SYSTOLIC BLOOD PRESSURE: 110 MMHG | WEIGHT: 216.49 LBS | BODY MASS INDEX: 34.79 KG/M2 | DIASTOLIC BLOOD PRESSURE: 60 MMHG

## 2025-01-25 DIAGNOSIS — E03.9 HYPOTHYROIDISM DURING PREGNANCY IN SECOND TRIMESTER: ICD-10-CM

## 2025-01-25 DIAGNOSIS — Z3A.22 22 WEEKS GESTATION OF PREGNANCY: Primary | ICD-10-CM

## 2025-01-25 DIAGNOSIS — Z36.86 ENCOUNTER FOR ANTENATAL SCREENING FOR CERVICAL LENGTH: ICD-10-CM

## 2025-01-25 DIAGNOSIS — O09.812 PREGNANCY RESULTING FROM IN VITRO FERTILIZATION IN SECOND TRIMESTER: ICD-10-CM

## 2025-01-25 DIAGNOSIS — O09.522 ELDERLY MULTIGRAVIDA, SECOND TRIMESTER: ICD-10-CM

## 2025-01-25 DIAGNOSIS — O09.292 HX OF PREECLAMPSIA, PRIOR PREGNANCY, CURRENTLY PREGNANT, SECOND TRIMESTER: ICD-10-CM

## 2025-01-25 DIAGNOSIS — O99.282 HYPOTHYROIDISM DURING PREGNANCY IN SECOND TRIMESTER: ICD-10-CM

## 2025-01-25 DIAGNOSIS — O09.292 HISTORY OF INTRAUTERINE GROWTH RESTRICTION IN PRIOR PREGNANCY, CURRENTLY PREGNANT, SECOND TRIMESTER: ICD-10-CM

## 2025-01-25 PROCEDURE — 76817 TRANSVAGINAL US OBSTETRIC: CPT | Performed by: OBSTETRICS & GYNECOLOGY

## 2025-01-25 PROCEDURE — 76811 OB US DETAILED SNGL FETUS: CPT | Performed by: OBSTETRICS & GYNECOLOGY

## 2025-01-25 PROCEDURE — 99214 OFFICE O/P EST MOD 30 MIN: CPT | Performed by: OBSTETRICS & GYNECOLOGY

## 2025-01-25 NOTE — LETTER
January 25, 2025     Ángel Kay MD  [address]   [address]  [facility] [address]    Patient: Azra Mahoney   YOB: 1990   Date of Visit: 1/25/2025       Dear Dr. Kay:    Thank you for referring Azra Mahoney to me for evaluation. Below are my notes for this consultation.    If you have questions, please do not hesitate to call me. I look forward to following your patient along with you.         Sincerely,        Todd Vargas MD        CC: No Recipients    Todd Vargas MD  1/25/2025  7:56 AM  Sign when Signing Visit  Please refer to the [facility] ultrasound report in Ob Procedures for additional information regarding today's visit

## 2025-01-25 NOTE — PROGRESS NOTES
Ultrasound Probe Disinfection    A transvaginal ultrasound was performed.   Prior to use, disinfection was performed with High Level [employer]infection Process ([employer]).  Probe serial number [id number] was used.    Serenity WALTERS Radice  01/25/25  7:59 AM

## 2025-01-27 ENCOUNTER — ROUTINE PRENATAL (OUTPATIENT)
Dept: OBGYN CLINIC | Facility: CLINIC | Age: 35
End: 2025-01-27
Payer: COMMERCIAL

## 2025-01-27 VITALS
SYSTOLIC BLOOD PRESSURE: 126 MMHG | HEIGHT: 66 IN | BODY MASS INDEX: 34.55 KG/M2 | DIASTOLIC BLOOD PRESSURE: 84 MMHG | WEIGHT: 215 LBS

## 2025-01-27 DIAGNOSIS — E03.9 HYPOTHYROIDISM AFFECTING PREGNANCY IN SECOND TRIMESTER: ICD-10-CM

## 2025-01-27 DIAGNOSIS — O99.282 HYPOTHYROIDISM AFFECTING PREGNANCY IN SECOND TRIMESTER: ICD-10-CM

## 2025-01-27 DIAGNOSIS — O34.211 MATERNAL CARE DUE TO LOW TRANSVERSE UTERINE SCAR FROM PREVIOUS CESAREAN DELIVERY: ICD-10-CM

## 2025-01-27 DIAGNOSIS — O09.522 MULTIGRAVIDA OF ADVANCED MATERNAL AGE IN SECOND TRIMESTER: ICD-10-CM

## 2025-01-27 DIAGNOSIS — Z36.89 ENCOUNTER FOR OTHER SPECIFIED ANTENATAL SCREENING: ICD-10-CM

## 2025-01-27 DIAGNOSIS — O09.812 PREGNANCY RESULTING FROM IN VITRO FERTILIZATION IN SECOND TRIMESTER: ICD-10-CM

## 2025-01-27 DIAGNOSIS — O99.212 OTHER OBESITY DUE TO EXCESS CALORIES AFFECTING PREGNANCY IN SECOND TRIMESTER: ICD-10-CM

## 2025-01-27 DIAGNOSIS — O09.299 HISTORY OF HELLP SYNDROME, CURRENTLY PREGNANT: ICD-10-CM

## 2025-01-27 DIAGNOSIS — O09.299 HISTORY OF INTRAUTERINE GROWTH RESTRICTION IN PRIOR PREGNANCY, CURRENTLY PREGNANT: ICD-10-CM

## 2025-01-27 DIAGNOSIS — Z3A.22 22 WEEKS GESTATION OF PREGNANCY: Primary | ICD-10-CM

## 2025-01-27 DIAGNOSIS — E66.09 OTHER OBESITY DUE TO EXCESS CALORIES AFFECTING PREGNANCY IN SECOND TRIMESTER: ICD-10-CM

## 2025-01-27 LAB
SL AMB  POCT GLUCOSE, UA: NORMAL
SL AMB POCT URINE PROTEIN: NORMAL

## 2025-01-27 PROCEDURE — 81002 URINALYSIS NONAUTO W/O SCOPE: CPT | Performed by: OBSTETRICS & GYNECOLOGY

## 2025-01-27 PROCEDURE — PNV: Performed by: OBSTETRICS & GYNECOLOGY

## 2025-01-27 NOTE — PROGRESS NOTES
"Routine Prenatal Visit  [facility] - [address], [address], [place] [address]    Assessment/Plan:  Azra is a 34 y.o. year old  at 22w2d who presents for routine prenatal visit.     1. 22 weeks gestation of pregnancy  -     POCT urine dip  2. Encounter for other specified  screening  -     Glucose, 1H PG; Future  -     CBC; Future  -     RPR (MONITOR) W/REFL TITER (REFL); Future  -     Glucose, 1H PG  -     CBC  -     RPR (MONITOR) W/REFL TITER (REFL)  3. Hypothyroidism affecting pregnancy in second trimester  Assessment & Plan:  Recent TSH wnl  4. Multigravida of advanced maternal age in second trimester  Assessment & Plan:  Normal anatomy  5. History of HELLP syndrome, currently pregnant  Assessment & Plan:  Taking baby ASA  6. Pregnancy resulting from in vitro fertilization in second trimester  Assessment & Plan:  Has echo next month  7. Maternal care due to low transverse uterine scar from previous  delivery  Assessment & Plan:  Leaning toward c section with salpingectomy  8. Other obesity due to excess calories affecting pregnancy in second trimester  9. History of intrauterine growth restriction in prior pregnancy, currently pregnant  Assessment & Plan:  Has repeat growth        Subjective:   Azra Mahoney is a 34 y.o.  who presents for routine prenatal care at 22w2d.  Complaints today: Denies  LOF: -; VB: -; Contractions: -; FM: +    Objective:  /84 (BP Location: Left arm, Patient Position: Sitting, Cuff Size: Standard)   Ht 5' 6\" (1.676 m)   Wt 97.5 kg (215 lb)   BMI 34.70 kg/m²     General: Well appearing, no distress  Respiratory: Unlabored breathing  Abdomen: Soft, gravid, nontender  Extremities: Warm and well perfused.  Non tender.    Pregravid Weight/BMI: 90.7 kg (200 lb) (BMI 32.30)  Current Weight: 97.5 kg (215 lb)   Total Weight Gain: 6.804 kg (15 lb)     Pre-Kit Vitals      Flowsheet Row Most Recent Value   Prenatal Assessment  "   Fetal Heart Rate 159   Movement Present   Prenatal Vitals    Blood Pressure 126/84   Weight - Scale 97.5 kg (215 lb)   Urine Albumin/Glucose    Dilation/Effacement/Station    Vaginal Drainage    Edema              Maggie Nick DO  1/27/2025 11:22 AM

## 2025-02-03 ENCOUNTER — ROUTINE PRENATAL (OUTPATIENT)
Dept: PERINATAL CARE | Facility: OTHER | Age: 35
End: 2025-02-03
Payer: COMMERCIAL

## 2025-02-03 VITALS
BODY MASS INDEX: 36.29 KG/M2 | HEIGHT: 65 IN | WEIGHT: 217.8 LBS | DIASTOLIC BLOOD PRESSURE: 68 MMHG | SYSTOLIC BLOOD PRESSURE: 118 MMHG | HEART RATE: 96 BPM

## 2025-02-03 DIAGNOSIS — O09.812 PREGNANCY RESULTING FROM IN VITRO FERTILIZATION IN SECOND TRIMESTER: Primary | ICD-10-CM

## 2025-02-03 DIAGNOSIS — O09.299 HISTORY OF HELLP SYNDROME, CURRENTLY PREGNANT: ICD-10-CM

## 2025-02-03 DIAGNOSIS — Z3A.23 23 WEEKS GESTATION OF PREGNANCY: ICD-10-CM

## 2025-02-03 PROCEDURE — 76827 ECHO EXAM OF FETAL HEART: CPT | Performed by: OBSTETRICS & GYNECOLOGY

## 2025-02-03 PROCEDURE — 76825 ECHO EXAM OF FETAL HEART: CPT | Performed by: OBSTETRICS & GYNECOLOGY

## 2025-02-03 PROCEDURE — 93325 DOPPLER ECHO COLOR FLOW MAPG: CPT | Performed by: OBSTETRICS & GYNECOLOGY

## 2025-02-03 PROCEDURE — 99213 OFFICE O/P EST LOW 20 MIN: CPT | Performed by: OBSTETRICS & GYNECOLOGY

## 2025-02-03 NOTE — PROGRESS NOTES
The patient was seen today for an ultrasound.  Please see ultrasound report (located under Ob Procedures) for additional details.   Thank you very much for allowing us to participate in the care of this very nice patient.  Should you have any questions, please do not hesitate to contact me.     Kayode Bravo MD FACOG  Attending Physician, Maternal-Fetal Medicine  [facility]

## 2025-02-19 ENCOUNTER — TELEPHONE (OUTPATIENT)
Age: 35
End: 2025-02-19

## 2025-02-19 NOTE — TELEPHONE ENCOUNTER
Pt is 25w4d pregnant, calling with concerns for congestion, and possible sinus infection. Pt states she blew her nose and heard a pop in her ear, and now it feels like she is under water in the one ear. Does not have a PCP and is concerned. Unsure if OBGYN provider can see her or if she should go to UC. Not sure if there are safe medications in pregnancy that can help treat symptoms. RN provided care advice on safe over the counter medications in pregnancy to help with cold and congestion symptoms. Advised go to UC to rule out a ruptured ear drum, and informed that most oral steroids and antibiotics given to treat sinus infections and such are safe in pregnancy. UC providers typically educated on medications that are safe in pregnancy, however, pt can always call back if medications are prescribed by UC and nurses will verify. Pt verbalized an understanding. No further questions.

## 2025-02-24 ENCOUNTER — TELEPHONE (OUTPATIENT)
Age: 35
End: 2025-02-24

## 2025-02-24 ENCOUNTER — ROUTINE PRENATAL (OUTPATIENT)
Dept: OBGYN CLINIC | Facility: CLINIC | Age: 35
End: 2025-02-24
Payer: COMMERCIAL

## 2025-02-24 VITALS
HEIGHT: 66 IN | DIASTOLIC BLOOD PRESSURE: 74 MMHG | WEIGHT: 218 LBS | SYSTOLIC BLOOD PRESSURE: 128 MMHG | BODY MASS INDEX: 35.03 KG/M2

## 2025-02-24 DIAGNOSIS — O34.211 MATERNAL CARE DUE TO LOW TRANSVERSE UTERINE SCAR FROM PREVIOUS CESAREAN DELIVERY: ICD-10-CM

## 2025-02-24 DIAGNOSIS — O09.299 HISTORY OF HELLP SYNDROME, CURRENTLY PREGNANT: ICD-10-CM

## 2025-02-24 DIAGNOSIS — Z3A.26 26 WEEKS GESTATION OF PREGNANCY: Primary | ICD-10-CM

## 2025-02-24 DIAGNOSIS — O09.299 HISTORY OF INTRAUTERINE GROWTH RESTRICTION IN PRIOR PREGNANCY, CURRENTLY PREGNANT: ICD-10-CM

## 2025-02-24 LAB
SL AMB  POCT GLUCOSE, UA: NORMAL
SL AMB POCT URINE PROTEIN: NORMAL

## 2025-02-24 PROCEDURE — 81002 URINALYSIS NONAUTO W/O SCOPE: CPT | Performed by: OBSTETRICS & GYNECOLOGY

## 2025-02-24 PROCEDURE — PNV: Performed by: OBSTETRICS & GYNECOLOGY

## 2025-02-24 RX ORDER — AMOXICILLIN 875 MG/1
1 TABLET, COATED ORAL 2 TIMES DAILY
COMMUNITY
Start: 2025-02-19

## 2025-02-24 NOTE — PROGRESS NOTES
"Routine Prenatal Visit  [facility] - [place] [address], [place] [address]    Assessment/Plan:  Azra is a 34 y.o. year old  at 26w2d who presents for routine prenatal visit.     Assessment & Plan  26 weeks gestation of pregnancy    Orders:    POCT urine dip    History of HELLP syndrome, currently pregnant   -  Taking AS A162 mg daily until 36 weeks   -  Continue monitoring BP at home, inform Ob/Gyn if 140/90 of higher   -  Aware of normal fetal echo resutls   -  scheduled for Growth U/S with MFM at 28 weeks   -  Will start  testing at 32 weeks    -  pt aware of plan and indication for close follow up       Maternal care due to low transverse uterine scar from previous  delivery   -  Desires to schedule repeat C/S   -  Will schedule for EGA 39 week   -  informed patient C/S will be done earlier if/when indicated       History of intrauterine growth restriction in prior pregnancy, currently pregnant   -  scheduled for growth U/S with MFM          Next OB Visit 2 weeks.    Subjective:     CC: Prenatal care    Azra Mahoney is a 34 y.o.  female who presents for routine prenatal care at 26w2d.  Pregnancy ROS: no leakage of fluid, pelvic pain, or vaginal bleeding.  normal fetal movement.    The following portions of the patient's history were reviewed and updated as appropriate: allergies, current medications, past family history, past medical history, obstetric history, gynecologic history, past social history, past surgical history and problem list.      Objective:  /74 (BP Location: Right arm, Patient Position: Sitting, Cuff Size: Large)   Ht 5' 6\" (1.676 m)   Wt 98.9 kg (218 lb)   BMI 35.19 kg/m²   Pregravid Weight/BMI: 90.7 kg (200 lb) (BMI 32.30)  Current Weight: 98.9 kg (218 lb)   Total Weight Gain: 8.165 kg (18 lb)   Pre-Kit Vitals      Flowsheet Row Most Recent Value   Prenatal Assessment    Fetal Heart Rate 150   Fundal Height (cm) 26 cm "   Movement Present   Prenatal Vitals    Blood Pressure 128/74   Weight - Scale 98.9 kg (218 lb)   Urine Albumin/Glucose    Dilation/Effacement/Station    Vaginal Drainage    Draining Fluid No   Edema              General: Well appearing, no distress  Abdomen: Soft, gravid, nontender  Extremities: Non tender.

## 2025-02-24 NOTE — ASSESSMENT & PLAN NOTE
-  Desires to schedule repeat C/S   -  Will schedule for EGA 39 week   -  informed patient C/S will be done earlier if/when indicated

## 2025-02-24 NOTE — TELEPHONE ENCOUNTER
Pt calling in and would like to know when she should go to 1 hour glucose. Pt syaing that she was provided a paper, saying that the 1 hr glucose should be completed between 2/25/25 and 3/4/25. Pt is currently 26w2d and pt wanting to know if she needs to do it now, or if she needs to wait until 28w.

## 2025-02-24 NOTE — ASSESSMENT & PLAN NOTE
-  Taking AS A162 mg daily until 36 weeks   -  Continue monitoring BP at home, inform Ob/Gyn if 140/90 of higher   -  Aware of normal fetal echo resutls   -  scheduled for Growth U/S with MFM at 28 weeks   -  Will start  testing at 32 weeks    -  pt aware of plan and indication for close follow up

## 2025-02-24 NOTE — TELEPHONE ENCOUNTER
Order was provided by Dr. Nick.  The usual dates for lab draw should be at 28 weeks which is 3/8 to 3/15

## 2025-02-27 ENCOUNTER — TELEPHONE (OUTPATIENT)
Age: 35
End: 2025-02-27

## 2025-03-03 NOTE — TELEPHONE ENCOUNTER
Patient had called again to follow up on getting scheduled for . Please advise patient can be reached at [phone].

## 2025-03-08 LAB
EXTERNAL HEMOGLOBIN: 11.9 G/DL
EXTERNAL PLATELET COUNT: 175 K/ÂΜL
EXTERNAL SYPHILIS TOTAL IGG/IGM SCREENING: NORMAL

## 2025-03-10 ENCOUNTER — ROUTINE PRENATAL (OUTPATIENT)
Dept: OBGYN CLINIC | Facility: CLINIC | Age: 35
End: 2025-03-10
Payer: COMMERCIAL

## 2025-03-10 ENCOUNTER — RESULTS FOLLOW-UP (OUTPATIENT)
Dept: OBGYN CLINIC | Facility: CLINIC | Age: 35
End: 2025-03-10

## 2025-03-10 ENCOUNTER — ULTRASOUND (OUTPATIENT)
Dept: PERINATAL CARE | Facility: OTHER | Age: 35
End: 2025-03-10
Payer: COMMERCIAL

## 2025-03-10 VITALS
HEIGHT: 66 IN | DIASTOLIC BLOOD PRESSURE: 72 MMHG | HEART RATE: 97 BPM | SYSTOLIC BLOOD PRESSURE: 120 MMHG | BODY MASS INDEX: 35.68 KG/M2 | WEIGHT: 222 LBS

## 2025-03-10 VITALS
HEIGHT: 66 IN | DIASTOLIC BLOOD PRESSURE: 72 MMHG | SYSTOLIC BLOOD PRESSURE: 114 MMHG | WEIGHT: 223.2 LBS | BODY MASS INDEX: 35.87 KG/M2

## 2025-03-10 DIAGNOSIS — O99.282 HYPOTHYROIDISM AFFECTING PREGNANCY IN SECOND TRIMESTER: ICD-10-CM

## 2025-03-10 DIAGNOSIS — O09.812 PREGNANCY RESULTING FROM IN VITRO FERTILIZATION IN SECOND TRIMESTER: ICD-10-CM

## 2025-03-10 DIAGNOSIS — O09.299 HISTORY OF INTRAUTERINE GROWTH RESTRICTION IN PRIOR PREGNANCY, CURRENTLY PREGNANT: ICD-10-CM

## 2025-03-10 DIAGNOSIS — O34.211 MATERNAL CARE DUE TO LOW TRANSVERSE UTERINE SCAR FROM PREVIOUS CESAREAN DELIVERY: ICD-10-CM

## 2025-03-10 DIAGNOSIS — O09.299 HISTORY OF HELLP SYNDROME, CURRENTLY PREGNANT: ICD-10-CM

## 2025-03-10 DIAGNOSIS — Z3A.28 28 WEEKS GESTATION OF PREGNANCY: Primary | ICD-10-CM

## 2025-03-10 DIAGNOSIS — Z3A.22 22 WEEKS GESTATION OF PREGNANCY: ICD-10-CM

## 2025-03-10 DIAGNOSIS — O09.522 MULTIGRAVIDA OF ADVANCED MATERNAL AGE IN SECOND TRIMESTER: ICD-10-CM

## 2025-03-10 DIAGNOSIS — Z87.51 HISTORY OF PRETERM DELIVERY: ICD-10-CM

## 2025-03-10 DIAGNOSIS — E66.09 OTHER OBESITY DUE TO EXCESS CALORIES AFFECTING PREGNANCY IN SECOND TRIMESTER: ICD-10-CM

## 2025-03-10 DIAGNOSIS — E03.9 HYPOTHYROIDISM AFFECTING PREGNANCY IN SECOND TRIMESTER: ICD-10-CM

## 2025-03-10 DIAGNOSIS — O99.212 OTHER OBESITY DUE TO EXCESS CALORIES AFFECTING PREGNANCY IN SECOND TRIMESTER: ICD-10-CM

## 2025-03-10 DIAGNOSIS — Z36.89 ENCOUNTER FOR ULTRASOUND TO CHECK FETAL GROWTH: ICD-10-CM

## 2025-03-10 LAB
ERYTHROCYTE [DISTWIDTH] IN BLOOD BY AUTOMATED COUNT: 12.9 % (ref 11–15)
GLUCOSE 1H P 50 G GLC PO SERPL-MCNC: 106 MG/DL
HCT VFR BLD AUTO: 35.4 % (ref 35–45)
HGB BLD-MCNC: 11.9 G/DL (ref 11.7–15.5)
MCH RBC QN AUTO: 30.1 PG (ref 27–33)
MCHC RBC AUTO-ENTMCNC: 33.6 G/DL (ref 32–36)
MCV RBC AUTO: 89.6 FL (ref 80–100)
PLATELET # BLD AUTO: 175 THOUSAND/UL (ref 140–400)
PMV BLD REES-ECKER: 11.8 FL (ref 7.5–12.5)
RBC # BLD AUTO: 3.95 MILLION/UL (ref 3.8–5.1)
RPR SER QL: NORMAL
SL AMB  POCT GLUCOSE, UA: NORMAL
SL AMB POCT URINE PROTEIN: NORMAL
WBC # BLD AUTO: 5.8 THOUSAND/UL (ref 3.8–10.8)

## 2025-03-10 PROCEDURE — 81002 URINALYSIS NONAUTO W/O SCOPE: CPT | Performed by: NURSE PRACTITIONER

## 2025-03-10 PROCEDURE — 76816 OB US FOLLOW-UP PER FETUS: CPT | Performed by: OBSTETRICS & GYNECOLOGY

## 2025-03-10 PROCEDURE — PNV: Performed by: NURSE PRACTITIONER

## 2025-03-10 PROCEDURE — 99213 OFFICE O/P EST LOW 20 MIN: CPT | Performed by: OBSTETRICS & GYNECOLOGY

## 2025-03-10 NOTE — PROGRESS NOTES
"[facility]: Ms. Mahoney was seen today for fetal growth assessment ultrasound.  See ultrasound report under \"OB Procedures\" tab.   The time spent on this established patient on the encounter date included 5 minutes previsit service time reviewing records and precharting, 10 minutes face-to-face service time counseling regarding results and coordinating care, and  5 minutes charting, totalling 20 minutes.  Please don't hesitate to contact our office with any concerns or questions.  -Kathleen Jefferson MD    "

## 2025-03-10 NOTE — LETTER
"  Date: 3/10/2025    Maggie Nick V, DO  [address] [place] [address]  [address]  [facility] [address]    Patient: Azra Mahoney   YOB: 1990   Date of Visit: 3/10/2025   Gestational age 28w2d   Nature of this communication: Routine though please note she requested some labs bc of her prior HELLP so I ordered them.       This patient was seen recently in our  office.  Please see ultrasound report under \"OB Procedures\" tab.  Please don't hesitate to contact our office with any concerns or questions.      Sincerely,      Kathleen Jefferson MD  Attending Physician, Maternal-Fetal Medicine  [facility]       "

## 2025-03-10 NOTE — PROGRESS NOTES
Routine Prenatal Visit  [facility] - [place]  [address] [place] [address]    Assessment/Plan:  Azra is a 34 y.o. year old  at 28w2d who presents for routine prenatal visit.     1. 28 weeks gestation of pregnancy  Assessment & Plan:  Reviewed [facility], S/S PTL  Normal third trimester labs  Orders:  -     POCT urine dip  2. Hypothyroidism affecting pregnancy in second trimester  Assessment & Plan:  Repeat TSH ordered  Orders:  -     TSH, 3rd generation with Free T4 reflex; Future  -     TSH, 3rd generation with Free T4 reflex  3. Multigravida of advanced maternal age in second trimester  4. History of HELLP syndrome, currently pregnant  Assessment & Plan:  Normal baseline labs   Repeat labs ordered by MFM today  Weekly APFS @ 32 weeks  5. Pregnancy resulting from in vitro fertilization in second trimester  6. Maternal care due to low transverse uterine scar from previous  delivery  Assessment & Plan:  Repeat scheduled for 39 weeks, 2025    7. Other obesity due to excess calories affecting pregnancy in second trimester  8. History of intrauterine growth restriction in prior pregnancy, currently pregnant  Assessment & Plan:  3/10/2025 55th percentile  9. 22 weeks gestation of pregnancy  Assessment & Plan:  Reviewed FMC, S/S PTL  Normal third trimester labs  10. History of  delivery  Assessment & Plan:  33 week iatrogenic  delivery secondary to HELLP       Next OB Visit 2 weeks.    Subjective:     CC: Prenatal care    Azra Mahoney is a 34 y.o.  female who presents for routine prenatal care at 28w2d. Pt feels well, no complaints.   Pregnancy ROS: no leakage of fluid, pelvic pain, or vaginal bleeding.  normal fetal movement.    The following portions of the patient's history were reviewed and updated as appropriate: allergies, current medications, past family history, past medical history, obstetric history, gynecologic history, past social history, past surgical  "history and problem list.      Objective:  /72 (BP Location: Left arm, Patient Position: Sitting, Cuff Size: Standard)   Ht 5' 6\" (1.676 m)   Wt 101 kg (223 lb 3.2 oz)   BMI 36.03 kg/m²   Pregravid Weight/BMI: 90.7 kg (200 lb) (BMI 32.30)  Current Weight: 101 kg (223 lb 3.2 oz)   Total Weight Gain: 10.5 kg (23 lb 3.2 oz)   Pre- Vitals      Flowsheet Row Most Recent Value   Prenatal Assessment    Fetal Heart Rate 146   Movement Present   Prenatal Vitals    Blood Pressure 114/72   Weight - Scale 101 kg (223 lb 3.2 oz)   Urine Albumin/Glucose    Dilation/Effacement/Station    Vaginal Drainage    Draining Fluid No   Edema    LLE Edema None   RLE Edema None             General: Well appearing, no distress  Abdomen: Soft, gravid, nontender  Extremities: Non tender.  "

## 2025-03-12 ENCOUNTER — TELEPHONE (OUTPATIENT)
Age: 35
End: 2025-03-12

## 2025-03-12 NOTE — TELEPHONE ENCOUNTER
28w4d OB reports her son was watched by her inlaws on the weekend and her father in law has been diagnosed with shingles. Patient did not have direct contact with her father in law and she did have chicken pox as a child.     Reviewed with patient avoiding contact, frequent handwashing and monitoring at this time- standard precautions.

## 2025-03-22 LAB
ALBUMIN SERPL-MCNC: 3.3 G/DL (ref 3.6–5.1)
ALBUMIN/GLOB SERPL: 1.4 (CALC) (ref 1–2.5)
ALP SERPL-CCNC: 77 U/L (ref 31–125)
ALT SERPL-CCNC: 13 U/L (ref 6–29)
AST SERPL-CCNC: 14 U/L (ref 10–30)
BILIRUB SERPL-MCNC: 0.3 MG/DL (ref 0.2–1.2)
BUN SERPL-MCNC: 12 MG/DL (ref 7–25)
BUN/CREAT SERPL: ABNORMAL (CALC) (ref 6–22)
CALCIUM SERPL-MCNC: 8.8 MG/DL (ref 8.6–10.2)
CHLORIDE SERPL-SCNC: 107 MMOL/L (ref 98–110)
CO2 SERPL-SCNC: 23 MMOL/L (ref 20–32)
CREAT SERPL-MCNC: 0.65 MG/DL (ref 0.5–0.97)
ERYTHROCYTE [DISTWIDTH] IN BLOOD BY AUTOMATED COUNT: 12.9 % (ref 11–15)
GFR/BSA.PRED SERPLBLD CYS-BASED-ARV: 117 ML/MIN/1.73M2
GLOBULIN SER CALC-MCNC: 2.3 G/DL (CALC) (ref 1.9–3.7)
GLUCOSE SERPL-MCNC: 78 MG/DL (ref 65–139)
HCT VFR BLD AUTO: 32.4 % (ref 35–45)
HGB BLD-MCNC: 11.2 G/DL (ref 11.7–15.5)
MCH RBC QN AUTO: 30.1 PG (ref 27–33)
MCHC RBC AUTO-ENTMCNC: 34.6 G/DL (ref 32–36)
MCV RBC AUTO: 87.1 FL (ref 80–100)
PLATELET # BLD AUTO: 176 THOUSAND/UL (ref 140–400)
PMV BLD REES-ECKER: 11.8 FL (ref 7.5–12.5)
POTASSIUM SERPL-SCNC: 4 MMOL/L (ref 3.5–5.3)
PROT SERPL-MCNC: 5.6 G/DL (ref 6.1–8.1)
RBC # BLD AUTO: 3.72 MILLION/UL (ref 3.8–5.1)
SODIUM SERPL-SCNC: 137 MMOL/L (ref 135–146)
URATE SERPL-MCNC: 4.3 MG/DL (ref 2.5–7)
WBC # BLD AUTO: 6.8 THOUSAND/UL (ref 3.8–10.8)

## 2025-03-23 ENCOUNTER — RESULTS FOLLOW-UP (OUTPATIENT)
Facility: HOSPITAL | Age: 35
End: 2025-03-23

## 2025-03-24 ENCOUNTER — ROUTINE PRENATAL (OUTPATIENT)
Dept: OBGYN CLINIC | Facility: CLINIC | Age: 35
End: 2025-03-24
Payer: COMMERCIAL

## 2025-03-24 VITALS
SYSTOLIC BLOOD PRESSURE: 116 MMHG | HEIGHT: 66 IN | DIASTOLIC BLOOD PRESSURE: 74 MMHG | WEIGHT: 223 LBS | BODY MASS INDEX: 35.84 KG/M2

## 2025-03-24 DIAGNOSIS — O99.282 HYPOTHYROIDISM AFFECTING PREGNANCY IN SECOND TRIMESTER: ICD-10-CM

## 2025-03-24 DIAGNOSIS — Z23 NEED FOR TDAP VACCINATION: ICD-10-CM

## 2025-03-24 DIAGNOSIS — Z3A.30 30 WEEKS GESTATION OF PREGNANCY: ICD-10-CM

## 2025-03-24 DIAGNOSIS — O09.299 HISTORY OF INTRAUTERINE GROWTH RESTRICTION IN PRIOR PREGNANCY, CURRENTLY PREGNANT: ICD-10-CM

## 2025-03-24 DIAGNOSIS — E66.09 OTHER OBESITY DUE TO EXCESS CALORIES AFFECTING PREGNANCY IN SECOND TRIMESTER: ICD-10-CM

## 2025-03-24 DIAGNOSIS — O99.212 OTHER OBESITY DUE TO EXCESS CALORIES AFFECTING PREGNANCY IN SECOND TRIMESTER: ICD-10-CM

## 2025-03-24 DIAGNOSIS — O34.211 MATERNAL CARE DUE TO LOW TRANSVERSE UTERINE SCAR FROM PREVIOUS CESAREAN DELIVERY: ICD-10-CM

## 2025-03-24 DIAGNOSIS — O09.813 PREGNANCY RESULTING FROM IN VITRO FERTILIZATION IN THIRD TRIMESTER: Primary | ICD-10-CM

## 2025-03-24 DIAGNOSIS — O09.299 HISTORY OF HELLP SYNDROME, CURRENTLY PREGNANT: ICD-10-CM

## 2025-03-24 DIAGNOSIS — E03.9 HYPOTHYROIDISM AFFECTING PREGNANCY IN SECOND TRIMESTER: ICD-10-CM

## 2025-03-24 DIAGNOSIS — O09.523 MULTIGRAVIDA OF ADVANCED MATERNAL AGE IN THIRD TRIMESTER: ICD-10-CM

## 2025-03-24 LAB
SL AMB  POCT GLUCOSE, UA: NORMAL
SL AMB POCT URINE PROTEIN: NORMAL

## 2025-03-24 PROCEDURE — 90471 IMMUNIZATION ADMIN: CPT | Performed by: STUDENT IN AN ORGANIZED HEALTH CARE EDUCATION/TRAINING PROGRAM

## 2025-03-24 PROCEDURE — 81002 URINALYSIS NONAUTO W/O SCOPE: CPT | Performed by: STUDENT IN AN ORGANIZED HEALTH CARE EDUCATION/TRAINING PROGRAM

## 2025-03-24 PROCEDURE — 90715 TDAP VACCINE 7 YRS/> IM: CPT | Performed by: STUDENT IN AN ORGANIZED HEALTH CARE EDUCATION/TRAINING PROGRAM

## 2025-03-24 PROCEDURE — PNV: Performed by: STUDENT IN AN ORGANIZED HEALTH CARE EDUCATION/TRAINING PROGRAM

## 2025-03-24 NOTE — ASSESSMENT & PLAN NOTE
- PTL/PPROM/Bleeding precautions given. Kick counts reviewed  - Third trimester labs reviewed.  - Recommendation for administration of TDaP was reviewed. TDaP administered today. Discussed recommendation for partner and close family members to ensure up-to-date on TDaP vaccination prior to delivery.   - Problem list updated, results console reviewed and updated with pertinent prenatal labs.  - PMH, PSH, medications reviewed and updated as needed  - Return to office in 2 wk(s) for routine prenatal care    Orders:  •  POCT urine dip

## 2025-03-24 NOTE — PROGRESS NOTES
Routine Prenatal Visit  [facility] - [place]  [address], [place] [address]  Assessment & Plan  Pregnancy resulting from in vitro fertilization in third trimester  - Continue  mg PO daily until 36 weeks for pre-eclampsia risk reduction.          History of HELLP syndrome, currently pregnant  - Reviewed normal labs completed last week, as ordered by MFM.   - Plan for initiation of APFS at 32 weeks, as scheduled with MFM.        Maternal care due to low transverse uterine scar from previous  delivery  - Repeat  delivery scheduled at 39 weeks.       Hypothyroidism affecting pregnancy in second trimester  - Recommend repeat TSH now. Order previously provided. Requisition slip reprinted and provided to patient today.   - Continue levothyroxine.       History of intrauterine growth restriction in prior pregnancy, currently pregnant  - Growth ultrasound scheduled for 32 week.        Multigravida of advanced maternal age in third trimester         Other obesity due to excess calories affecting pregnancy in second trimester         30 weeks gestation of pregnancy  - PTL/PPROM/Bleeding precautions given. Kick counts reviewed  - Third trimester labs reviewed.  - Recommendation for administration of TDaP was reviewed. TDaP administered today. Discussed recommendation for partner and close family members to ensure up-to-date on TDaP vaccination prior to delivery.   - Problem list updated, results console reviewed and updated with pertinent prenatal labs.  - PMH, PSH, medications reviewed and updated as needed  - Return to office in 2 wk(s) for routine prenatal care    Orders:  •  POCT urine dip    Need for Tdap vaccination    Orders:  •  TDAP VACCINE GREATER THAN OR EQUAL TO 6YO IM    Subjective:   Azra Mahoney is a 34 y.o.  who presents for routine prenatal care at 30w2d.  Complaints today: None  LOF: No; VB: No; Contractions: No; FM: Present and normal    Objective:  /74 (BP  "Location: Left arm, Patient Position: Sitting, Cuff Size: Standard)   Ht 5' 6\" (1.676 m)   Wt 101 kg (223 lb)   BMI 35.99 kg/m²     General: Well appearing, no distress  Respiratory: Unlabored breathing  Cardiovascular: Regular rate.  Abdomen: Soft, gravid, nontender  Extremities: Warm and well perfused.  Non tender.    Pregravid Weight/BMI: 90.7 kg (200 lb) (BMI 32.30)  Current Weight: 101 kg (223 lb)   Total Weight Gain: 10.4 kg (23 lb)     Pre- Vitals    Flowsheet Row Most Recent Value   Prenatal Assessment    Fetal Heart Rate 140   Fundal Height (cm) 30 cm   Movement Present   Prenatal Vitals    Blood Pressure 116/74   Weight - Scale 101 kg (223 lb)   Urine Albumin/Glucose    Dilation/Effacement/Station    Vaginal Drainage    Draining Fluid No   Edema    LLE Edema None   RLE Edema None   Facial Edema None           Chacorta Lewis MD  3/24/2025 10:00 AM   "

## 2025-03-24 NOTE — ASSESSMENT & PLAN NOTE
- Reviewed normal labs completed last week, as ordered by MFM.   - Plan for initiation of APFS at 32 weeks, as scheduled with MFM.

## 2025-03-24 NOTE — ASSESSMENT & PLAN NOTE
- Recommend repeat TSH now. Order previously provided. Requisition slip reprinted and provided to patient today.   - Continue levothyroxine.

## 2025-03-24 NOTE — PATIENT INSTRUCTIONS
You should take your blood pressure at home one time daily.     Normal-range blood pressures in pregnancy are less than 140 for the top number (systolic) AND less than 90 for the bottom number (diastolic)    Mild-range blood pressures in pregnancy are 140-159 systolic OR  diastolic    Severe-range blood pressures in pregnancy are 160 or greater stystolic  or greater diastolic    If your blood pressure is greater than 140/90, you should repeat your blood pressure in 15 minutes and then call [facility] - [place] at [phone] and proceed to the hospital for evaluation for pre-eclampsia.    Symptoms of Pre-Eclampsia include headache that does not go away with Tylenol, changes in vision such as blurred vision or spots in your vision, chest pain, shortness of breath, pain in the upper right side of your abdomen, or sudden onset or worsening of swelling.    If you develop any of the above symptoms, you should promptly call [facility] - [place] at [phone] and present to the hospital for evaluation of pre-eclampsia.

## 2025-03-27 ENCOUNTER — TELEPHONE (OUTPATIENT)
Dept: OBGYN CLINIC | Facility: CLINIC | Age: 35
End: 2025-03-27

## 2025-03-27 NOTE — TELEPHONE ENCOUNTER
Third Trimester call-Voicemail received, left a message for patient to call back and sent message through [employer].     Overall how are you feeling?     Compliant with routine OB appointments? yes    Have you completed your 3rd trimester lab work? yes    Have you reviewed the contents of 3rd trimester folder from office?    Have you decided on a pediatrician?     If yes, who     If no, reviewed practices and transferred call to [phone] to set up appointment with pediatric office.   Questions on paperwork to go back to office?    Questions on the baby birth certificate and photography forms?     Sent link for the Hospital Readiness Video via [employer]

## 2025-04-03 NOTE — PATIENT INSTRUCTIONS
"Patient Education     Your baby's movement before birth   The Basics   Written by the doctors and editors at [facility]   When should I start feeling my baby move? -- It depends. Most people first feel their baby moving in the uterus between about 16 and 20 weeks of pregnancy. It might take longer to feel movement if this is your first pregnancy or if the placenta is in the front of your uterus.  What kinds of movements should I feel? -- When you first feel your baby move, it might feel like a gentle flutter in your belly. This is sometimes called \"quickening.\" As the baby grows, their movements will get stronger. You will probably feel them kicking, rolling, and stretching. Later in pregnancy, you might be able to see and feel the baby moving from the outside.  You might notice that your baby is more active at certain times of the day or night. Even before birth, babies have periods of being asleep and awake. When your baby is sleeping, you might notice that they do not move as much.  Should I keep track of my baby's movements? -- If your pregnancy is healthy, you probably do not need to count or record your baby's movements. Feeling regular movement is a good sign that the baby is doing well.  In some cases, your doctor or midwife might ask you to keep track of your baby's movements. If so, they will tell you how to do this and when to call them.  A change in your baby's movements does not always mean that there is a problem. But in some cases, it can be a sign that the baby is having trouble. If your doctor or midwife is concerned, they can do tests to check on the baby.  If I am asked to track movement, how should I do it? -- There are different ways of tracking your baby's movement. This is sometimes called \"kick counting.\"  Your doctor or midwife will tell you exactly what to track. For example, they might ask you to write down:   How long it takes to feel 10 kicks or movements   How many times your baby moves " in 1 hour  Many experts consider at least 10 movements in 2 hours to be a sign that the baby is doing well. But there is no specific cutoff for exactly how much movement is healthy or unhealthy. Some babies are more active than others, and some pregnant people feel movement more easily than others. The main goal of kick counting is to get to know your baby's normal patterns so you can tell if anything changes.  If you are doing kick counting:   Choose a time of day when your baby is usually active.   Find a quiet place where you will not be distracted.   Lie down on your side in a comfortable position.   Check the clock, or set a timer.   Each time you feel your baby move or kick, write down the time. Some people use a smartphone ricardo to keep track.   If your baby seems less active than usual, try moving around, eating a snack, and emptying your bladder. This can help wake the baby up if they are asleep.   Stop counting after you have felt 10 kicks, or after the length of time your doctor or midwife told you.  When should I call the doctor? -- Call your doctor or midwife for advice if:   You have concerns about your baby's movement.   Your baby is moving less than they normally do.   You notice a sudden change in the pattern of your baby's movements.   You have any other symptoms that worry you.  All topics are updated as new evidence becomes available and our peer review process is complete.  This topic retrieved from [employer] on: Feb 26, 2024.  Topic 991520 Version 1.0  Release: 32.2.4 - C32.56  © 2024 UpToDate, Inc. and/or its affiliates. All rights reserved.  Consumer Information Use and Disclaimer   Disclaimer: This generalized information is a limited summary of diagnosis, treatment, and/or medication information. It is not meant to be comprehensive and should be used as a tool to help the user understand and/or assess potential diagnostic and treatment options. It does NOT include all information about  conditions, treatments, medications, side effects, or risks that may apply to a specific patient. It is not intended to be medical advice or a substitute for the medical advice, diagnosis, or treatment of a health care provider based on the health care provider's examination and assessment of a patient's specific and unique circumstances. Patients must speak with a health care provider for complete information about their health, medical questions, and treatment options, including any risks or benefits regarding use of medications. This information does not endorse any treatments or medications as safe, effective, or approved for treating a specific patient. UpToDate, Inc. and its affiliates disclaim any warranty or liability relating to this information or the use thereof.The use of this information is governed by the Terms of Use, available at https://www.globa.ly.com/en/know/clinical-effectiveness-terms. © UpToDate, Inc. and its affiliates and/or licensors. All rights reserved.  Copyright   ©  UpToDate, Inc. and/or its affiliates. All rights reserved.  Thank you for choosing us for your  care today.  If you have any questions about your ultrasound or care, please do not hesitate to contact us or your primary obstetrician.        Some general instructions for your pregnancy are:    Exercise: Aim for 150 minutes per week of regular exercise.  Walking is great!  Nutrition: Choose healthy sources of calcium, iron, and protein.  Avoid ultraprocessed foods and added sugar.  Learn about Preeclampsia: preeclampsia is a common, potentially serious high blood pressure complication in pregnancy.  A blood pressure of 140mmHg (systolic or top number) or 90mmHg (diastolic or bottom number) should be evaluated by your doctor.  Aspirin is sometimes prescribed in early pregnancy to prevent preeclampsia in women with risk factors - ask your obstetrician if you should be on this medication.  For more  resources, visit:  https://www.highriskpregnancyinfo.org/preeclampsia  If you smoke, please try to quit completely but also try to reduce your smoking by as much as possible (as soon as possible).  Do not vape.  Please also avoid cannabis products.  Other warning signs to watch out for in pregnancy or postpartum: chest pain, obstructed breathing or shortness of breath, seizures, thoughts of hurting yourself or your baby, bleeding, a painful or swollen leg, fever, or headache (see AWNN POST-BIRTH Warning Signs campaign).  If these happen call 911.  Itching is also not normal in pregnancy and if you experience this, especially over your hands and feet, potentially worse at night, notify your doctors.

## 2025-04-07 ENCOUNTER — ULTRASOUND (OUTPATIENT)
Dept: PERINATAL CARE | Facility: OTHER | Age: 35
End: 2025-04-07
Payer: COMMERCIAL

## 2025-04-07 ENCOUNTER — ROUTINE PRENATAL (OUTPATIENT)
Dept: OBGYN CLINIC | Facility: CLINIC | Age: 35
End: 2025-04-07

## 2025-04-07 VITALS
DIASTOLIC BLOOD PRESSURE: 68 MMHG | SYSTOLIC BLOOD PRESSURE: 116 MMHG | HEIGHT: 66 IN | WEIGHT: 223 LBS | BODY MASS INDEX: 35.84 KG/M2

## 2025-04-07 VITALS
HEIGHT: 66 IN | HEART RATE: 88 BPM | WEIGHT: 224.8 LBS | SYSTOLIC BLOOD PRESSURE: 102 MMHG | BODY MASS INDEX: 36.13 KG/M2 | DIASTOLIC BLOOD PRESSURE: 64 MMHG

## 2025-04-07 DIAGNOSIS — O09.299 HISTORY OF HELLP SYNDROME, CURRENTLY PREGNANT: Primary | ICD-10-CM

## 2025-04-07 DIAGNOSIS — Z3A.32 32 WEEKS GESTATION OF PREGNANCY: ICD-10-CM

## 2025-04-07 DIAGNOSIS — O99.213 MATERNAL OBESITY, ANTEPARTUM, THIRD TRIMESTER: ICD-10-CM

## 2025-04-07 DIAGNOSIS — O09.299 HISTORY OF INTRAUTERINE GROWTH RESTRICTION IN PRIOR PREGNANCY, CURRENTLY PREGNANT: ICD-10-CM

## 2025-04-07 DIAGNOSIS — O34.211 MATERNAL CARE DUE TO LOW TRANSVERSE UTERINE SCAR FROM PREVIOUS CESAREAN DELIVERY: ICD-10-CM

## 2025-04-07 DIAGNOSIS — O09.293 HISTORY OF INTRAUTERINE GROWTH RESTRICTION IN PRIOR PREGNANCY, CURRENTLY PREGNANT IN THIRD TRIMESTER: ICD-10-CM

## 2025-04-07 DIAGNOSIS — O99.283 HYPOTHYROIDISM IN PREGNANCY, THIRD TRIMESTER: ICD-10-CM

## 2025-04-07 DIAGNOSIS — E66.811 CLASS 1 OBESITY: ICD-10-CM

## 2025-04-07 DIAGNOSIS — E03.9 HYPOTHYROIDISM IN PREGNANCY, THIRD TRIMESTER: ICD-10-CM

## 2025-04-07 DIAGNOSIS — O09.813 PREGNANCY RESULTING FROM IN VITRO FERTILIZATION IN THIRD TRIMESTER: ICD-10-CM

## 2025-04-07 PROBLEM — Z3A.30 30 WEEKS GESTATION OF PREGNANCY: Status: RESOLVED | Noted: 2024-12-30 | Resolved: 2025-04-07

## 2025-04-07 PROCEDURE — PNV: Performed by: OBSTETRICS & GYNECOLOGY

## 2025-04-07 PROCEDURE — 76816 OB US FOLLOW-UP PER FETUS: CPT | Performed by: OBSTETRICS & GYNECOLOGY

## 2025-04-07 PROCEDURE — 99214 OFFICE O/P EST MOD 30 MIN: CPT | Performed by: OBSTETRICS & GYNECOLOGY

## 2025-04-07 PROCEDURE — 59025 FETAL NON-STRESS TEST: CPT | Performed by: OBSTETRICS & GYNECOLOGY

## 2025-04-07 NOTE — PROGRESS NOTES
"Routine Prenatal Visit  [facility] - [address], [address], [place] [address]    Assessment/Plan:  Azra is a 34 y.o. year old  at 32w2d who presents for routine prenatal visit.     Assessment & Plan  History of HELLP syndrome, currently pregnant     -  Taking  mg       Maternal care due to low transverse uterine scar from previous  delivery     -  scheduled for repeat C/S       History of intrauterine growth restriction in prior pregnancy, currently pregnant     -  AGA for current pregnancy.     -  Has growth U/S scheduled with MFM today       32 weeks gestation of pregnancy              Next OB Visit 2 weeks.    Subjective:     CC: Prenatal care    Azra Mahoney is a 34 y.o.  female who presents for routine prenatal care at 32w2d.  Pregnancy ROS: no leakage of fluid, pelvic pain, or vaginal bleeding.  normal fetal movement.    The following portions of the patient's history were reviewed and updated as appropriate: allergies, current medications, past family history, past medical history, obstetric history, gynecologic history, past social history, past surgical history and problem list.      Objective:  /68 (BP Location: Right arm, Patient Position: Sitting, Cuff Size: Large)   Ht 5' 6\" (1.676 m)   Wt 101 kg (223 lb)   BMI 35.99 kg/m²   Pregravid Weight/BMI: 90.7 kg (200 lb) (BMI 32.30)  Current Weight: 101 kg (223 lb)   Total Weight Gain: 10.4 kg (23 lb)   Pre-Kit Vitals      Flowsheet Row Most Recent Value   Prenatal Assessment    Fetal Heart Rate 140   Fundal Height (cm) 33 cm   Movement Present   Prenatal Vitals    Blood Pressure 116/68   Weight - Scale 101 kg (223 lb)   Urine Albumin/Glucose    Dilation/Effacement/Station    Vaginal Drainage    Draining Fluid No   Edema              General: Well appearing, no distress  Abdomen: Soft, gravid, nontender  Extremities: Non tender.  "

## 2025-04-07 NOTE — PROGRESS NOTES
Repeat Non-Stress Testing:    Patient verbalizes +FM. Pt denies ALL:               Leaking of fluid   Contractions   Vaginal bleeding   Decreased fetal movement    Patient is performing daily kick counts. Patient has no questions or concerns.   NST strip reviewed by Dr. Vargas in-person.

## 2025-04-07 NOTE — LETTER
April 7, 2025     Chacorta Lewis MD  [address]  [address]  [place] [facility] [address]    Patient: Azra Mahoney   YOB: 1990   Date of Visit: 4/7/2025       Dear Dr. Chacorta Lewis MD:    Thank you for referring Azra Mahoney to me for evaluation. Below are my notes for this consultation.    If you have questions, please do not hesitate to call me. I look forward to following your patient along with you.         Sincerely,        Todd Vargas MD        CC: No Recipients    Todd Vargas MD  4/7/2025  2:09 PM  Sign when Signing Visit  Please refer to the [facility] ultrasound report in Ob Procedures for additional information regarding today's visit

## 2025-04-07 NOTE — LETTER
NST sleeve cover sheet    Patient name: Azra Mahoney  : 1990  MRN: [id number]    JULIANNE: Estimated Date of Delivery: 25    Obstetrician: Emili    Reason(s) for testing: Prior HELLP and IUGR with PTD, IVF    Testing frequency:    ___  2x/wk  ___  1x/wk  ___  Dopplers  ___  BPP?      Last growth scan: __________, _________, _________    Baby:      Male   /   Female   /   [place]             Baby Name: ___________________            IOL or  C/S: _____________________

## 2025-04-09 ENCOUNTER — RESULTS FOLLOW-UP (OUTPATIENT)
Dept: OBGYN CLINIC | Facility: CLINIC | Age: 35
End: 2025-04-09

## 2025-04-09 ENCOUNTER — RESULTS FOLLOW-UP (OUTPATIENT)
Dept: PERINATAL CARE | Facility: OTHER | Age: 35
End: 2025-04-09

## 2025-04-09 LAB
ALBUMIN SERPL-MCNC: 3.2 G/DL (ref 3.6–5.1)
ALBUMIN/GLOB SERPL: 1.4 (CALC) (ref 1–2.5)
ALP SERPL-CCNC: 89 U/L (ref 31–125)
ALT SERPL-CCNC: 13 U/L (ref 6–29)
AST SERPL-CCNC: 13 U/L (ref 10–30)
BILIRUB SERPL-MCNC: 0.2 MG/DL (ref 0.2–1.2)
BUN SERPL-MCNC: 11 MG/DL (ref 7–25)
BUN/CREAT SERPL: ABNORMAL (CALC) (ref 6–22)
CALCIUM SERPL-MCNC: 9 MG/DL (ref 8.6–10.2)
CHLORIDE SERPL-SCNC: 104 MMOL/L (ref 98–110)
CO2 SERPL-SCNC: 23 MMOL/L (ref 20–32)
CREAT SERPL-MCNC: 0.72 MG/DL (ref 0.5–0.97)
ERYTHROCYTE [DISTWIDTH] IN BLOOD BY AUTOMATED COUNT: 12.5 % (ref 11–15)
GFR/BSA.PRED SERPLBLD CYS-BASED-ARV: 111 ML/MIN/1.73M2
GLOBULIN SER CALC-MCNC: 2.3 G/DL (CALC) (ref 1.9–3.7)
GLUCOSE SERPL-MCNC: 104 MG/DL (ref 65–99)
HCT VFR BLD AUTO: 33.3 % (ref 35–45)
HGB BLD-MCNC: 11.1 G/DL (ref 11.7–15.5)
MCH RBC QN AUTO: 28.7 PG (ref 27–33)
MCHC RBC AUTO-ENTMCNC: 33.3 G/DL (ref 32–36)
MCV RBC AUTO: 86 FL (ref 80–100)
PLATELET # BLD AUTO: 170 THOUSAND/UL (ref 140–400)
PMV BLD REES-ECKER: 11.6 FL (ref 7.5–12.5)
POTASSIUM SERPL-SCNC: 3.9 MMOL/L (ref 3.5–5.3)
PROT SERPL-MCNC: 5.5 G/DL (ref 6.1–8.1)
RBC # BLD AUTO: 3.87 MILLION/UL (ref 3.8–5.1)
SODIUM SERPL-SCNC: 135 MMOL/L (ref 135–146)
TSH SERPL-ACNC: 0.53 MIU/L
WBC # BLD AUTO: 7.1 THOUSAND/UL (ref 3.8–10.8)

## 2025-04-14 ENCOUNTER — ULTRASOUND (OUTPATIENT)
Dept: PERINATAL CARE | Facility: OTHER | Age: 35
End: 2025-04-14
Attending: OBSTETRICS & GYNECOLOGY
Payer: COMMERCIAL

## 2025-04-14 VITALS
DIASTOLIC BLOOD PRESSURE: 64 MMHG | HEART RATE: 91 BPM | HEIGHT: 66 IN | BODY MASS INDEX: 36.35 KG/M2 | SYSTOLIC BLOOD PRESSURE: 124 MMHG | WEIGHT: 226.2 LBS

## 2025-04-14 DIAGNOSIS — O09.813 PREGNANCY RESULTING FROM IN VITRO FERTILIZATION IN THIRD TRIMESTER: ICD-10-CM

## 2025-04-14 DIAGNOSIS — Z3A.33 33 WEEKS GESTATION OF PREGNANCY: ICD-10-CM

## 2025-04-14 DIAGNOSIS — O09.293 HISTORY OF INTRAUTERINE GROWTH RESTRICTION IN PRIOR PREGNANCY, CURRENTLY PREGNANT IN THIRD TRIMESTER: ICD-10-CM

## 2025-04-14 DIAGNOSIS — O09.299 HISTORY OF HELLP SYNDROME, CURRENTLY PREGNANT: Primary | ICD-10-CM

## 2025-04-14 PROCEDURE — 59025 FETAL NON-STRESS TEST: CPT | Performed by: OBSTETRICS & GYNECOLOGY

## 2025-04-14 PROCEDURE — 76815 OB US LIMITED FETUS(S): CPT | Performed by: OBSTETRICS & GYNECOLOGY

## 2025-04-14 NOTE — PROGRESS NOTES
Repeat Non-Stress Testing:    Patient verbalizes +FM. Pt denies ALL:               Leaking of fluid   Contractions   Vaginal bleeding   Decreased fetal movement    Patient is performing daily kick counts. Patient has no questions or concerns.   NST strip reviewed by Dr. Bravo in-person.

## 2025-04-21 ENCOUNTER — ROUTINE PRENATAL (OUTPATIENT)
Dept: OBGYN CLINIC | Facility: CLINIC | Age: 35
End: 2025-04-21
Payer: COMMERCIAL

## 2025-04-21 VITALS
BODY MASS INDEX: 36.8 KG/M2 | DIASTOLIC BLOOD PRESSURE: 78 MMHG | HEIGHT: 66 IN | SYSTOLIC BLOOD PRESSURE: 130 MMHG | WEIGHT: 229 LBS

## 2025-04-21 DIAGNOSIS — O99.283 HYPOTHYROIDISM AFFECTING PREGNANCY IN THIRD TRIMESTER: Primary | ICD-10-CM

## 2025-04-21 DIAGNOSIS — O09.299 HISTORY OF HELLP SYNDROME, CURRENTLY PREGNANT: ICD-10-CM

## 2025-04-21 DIAGNOSIS — O34.211 MATERNAL CARE DUE TO LOW TRANSVERSE UTERINE SCAR FROM PREVIOUS CESAREAN DELIVERY: ICD-10-CM

## 2025-04-21 DIAGNOSIS — O09.813 PREGNANCY RESULTING FROM IN VITRO FERTILIZATION IN THIRD TRIMESTER: ICD-10-CM

## 2025-04-21 DIAGNOSIS — O09.299 HISTORY OF INTRAUTERINE GROWTH RESTRICTION IN PRIOR PREGNANCY, CURRENTLY PREGNANT: ICD-10-CM

## 2025-04-21 DIAGNOSIS — E66.09 OTHER OBESITY DUE TO EXCESS CALORIES AFFECTING PREGNANCY IN THIRD TRIMESTER: ICD-10-CM

## 2025-04-21 DIAGNOSIS — O99.213 OTHER OBESITY DUE TO EXCESS CALORIES AFFECTING PREGNANCY IN THIRD TRIMESTER: ICD-10-CM

## 2025-04-21 DIAGNOSIS — E03.9 HYPOTHYROIDISM AFFECTING PREGNANCY IN THIRD TRIMESTER: Primary | ICD-10-CM

## 2025-04-21 DIAGNOSIS — Z3A.34 34 WEEKS GESTATION OF PREGNANCY: ICD-10-CM

## 2025-04-21 LAB
SL AMB  POCT GLUCOSE, UA: NORMAL
SL AMB POCT URINE PROTEIN: NORMAL

## 2025-04-21 PROCEDURE — 81002 URINALYSIS NONAUTO W/O SCOPE: CPT | Performed by: OBSTETRICS & GYNECOLOGY

## 2025-04-21 PROCEDURE — PNV: Performed by: OBSTETRICS & GYNECOLOGY

## 2025-04-21 NOTE — PROGRESS NOTES
"Routine Prenatal Visit  [facility] - [address], [address], [place] [address]    Assessment/Plan:  Azra is a 34 y.o. year old  at 34w2d who presents for routine prenatal visit.     1. Hypothyroidism affecting pregnancy in third trimester  2. History of HELLP syndrome, currently pregnant  Assessment & Plan:  Getting weekly APFS  3. Pregnancy resulting from in vitro fertilization in third trimester  4. Maternal care due to low transverse uterine scar from previous  delivery  Assessment & Plan:  Desires repeat with sterilization  5. Other obesity due to excess calories affecting pregnancy in third trimester  6. History of intrauterine growth restriction in prior pregnancy, currently pregnant  Assessment & Plan:  Normal growth 2 weeks ago  7. 34 weeks gestation of pregnancy  -     POCT urine dip        Subjective:   Azra Mahoney is a 34 y.o.  who presents for routine prenatal care at 34w2d.  Complaints today: Reports headaches on and off. Also with carpal tunnel pain since stopping chiropractor.   LOF: -; VB: -; Contractions: -; FM: +    Objective:  /78 (BP Location: Left arm, Patient Position: Sitting, Cuff Size: Standard)   Ht 5' 6\" (1.676 m)   Wt 104 kg (229 lb)   BMI 36.96 kg/m²     General: Well appearing, no distress  Respiratory: Unlabored breathing  Abdomen: Soft, gravid, nontender  Extremities: Warm and well perfused.  Non tender.    Pregravid Weight/BMI: 90.7 kg (200 lb) (BMI 32.30)  Current Weight: 104 kg (229 lb)   Total Weight Gain: 13.2 kg (29 lb)     Pre-Kit Vitals      Flowsheet Row Most Recent Value   Prenatal Assessment    Fetal Heart Rate 149   Fundal Height (cm) 34 cm   Movement Present   Prenatal Vitals    Blood Pressure 130/78   Weight - Scale 104 kg (229 lb)   Urine Albumin/Glucose    Dilation/Effacement/Station    Vaginal Drainage    Edema              Maggie Nick DO  2025 2:16 PM     "

## 2025-04-22 ENCOUNTER — ULTRASOUND (OUTPATIENT)
Age: 35
End: 2025-04-22
Attending: OBSTETRICS & GYNECOLOGY
Payer: COMMERCIAL

## 2025-04-22 VITALS
DIASTOLIC BLOOD PRESSURE: 68 MMHG | HEART RATE: 107 BPM | HEIGHT: 66 IN | BODY MASS INDEX: 37.09 KG/M2 | SYSTOLIC BLOOD PRESSURE: 124 MMHG | WEIGHT: 230.8 LBS

## 2025-04-22 DIAGNOSIS — Z3A.34 34 WEEKS GESTATION OF PREGNANCY: ICD-10-CM

## 2025-04-22 DIAGNOSIS — O09.813 PREGNANCY RESULTING FROM IN VITRO FERTILIZATION IN THIRD TRIMESTER: ICD-10-CM

## 2025-04-22 DIAGNOSIS — O09.293 HISTORY OF INTRAUTERINE GROWTH RESTRICTION IN PRIOR PREGNANCY, CURRENTLY PREGNANT IN THIRD TRIMESTER: ICD-10-CM

## 2025-04-22 DIAGNOSIS — O09.299 HISTORY OF HELLP SYNDROME, CURRENTLY PREGNANT: Primary | ICD-10-CM

## 2025-04-22 PROCEDURE — 76815 OB US LIMITED FETUS(S): CPT | Performed by: OBSTETRICS & GYNECOLOGY

## 2025-04-22 PROCEDURE — 59025 FETAL NON-STRESS TEST: CPT | Performed by: OBSTETRICS & GYNECOLOGY

## 2025-04-22 NOTE — PROGRESS NOTES
A fetal ultrasound and NST were completed. See Ob procedures in Epic for an interpretation and recommendations. Do not hesitate to contact us in [facility] if you have questions.    Horace Morelos MD, MSCE  Maternal Fetal Medicine

## 2025-04-22 NOTE — PROGRESS NOTES
Repeat Non-Stress Testing:    Patient verbalizes +FM. Pt denies ALL:               Leaking of fluid   Contractions   Vaginal bleeding   Decreased fetal movement    Patient is performing daily kick counts. Patient has no questions or concerns.   NST strip reviewed by Dr. Morelos in-person.

## 2025-04-27 NOTE — PROGRESS NOTES
Please refer to the [facility] ultrasound report in Ob Procedures for additional information regarding today's visit

## 2025-04-28 ENCOUNTER — ULTRASOUND (OUTPATIENT)
Dept: PERINATAL CARE | Facility: OTHER | Age: 35
End: 2025-04-28
Attending: OBSTETRICS & GYNECOLOGY
Payer: COMMERCIAL

## 2025-04-28 VITALS
WEIGHT: 226.8 LBS | BODY MASS INDEX: 36.45 KG/M2 | DIASTOLIC BLOOD PRESSURE: 74 MMHG | HEIGHT: 66 IN | SYSTOLIC BLOOD PRESSURE: 128 MMHG | HEART RATE: 99 BPM

## 2025-04-28 DIAGNOSIS — Z3A.35 35 WEEKS GESTATION OF PREGNANCY: Primary | ICD-10-CM

## 2025-04-28 DIAGNOSIS — O09.293 HX OF PREECLAMPSIA, PRIOR PREGNANCY, CURRENTLY PREGNANT, THIRD TRIMESTER: ICD-10-CM

## 2025-04-28 DIAGNOSIS — O09.813 PREGNANCY RESULTING FROM IN VITRO FERTILIZATION IN THIRD TRIMESTER: ICD-10-CM

## 2025-04-28 PROCEDURE — 59025 FETAL NON-STRESS TEST: CPT | Performed by: OBSTETRICS & GYNECOLOGY

## 2025-04-28 PROCEDURE — 76815 OB US LIMITED FETUS(S): CPT | Performed by: OBSTETRICS & GYNECOLOGY

## 2025-04-28 RX ORDER — CALCIUM CARBONATE 500 MG/1
1 TABLET, CHEWABLE ORAL DAILY
COMMUNITY

## 2025-04-28 RX ORDER — FAMOTIDINE 10 MG
10 TABLET ORAL 2 TIMES DAILY PRN
COMMUNITY

## 2025-04-28 NOTE — LETTER
April 28, 2025     Chacorta Lewis MD  [address]  [address]  [facility] [address]    Patient: Azra Mahoney   YOB: 1990   Date of Visit: 4/28/2025       Dear Dr. Chacorta Lewis MD:    Thank you for referring Azra Mahoney to me for evaluation. Below are my notes for this consultation.    If you have questions, please do not hesitate to call me. I look forward to following your patient along with you.         Sincerely,        Todd Vargas MD        CC: No Recipients    Todd Vargas MD  4/27/2025 11:58 AM  Sign when Signing Visit  Please refer to the [facility] ultrasound report in Ob Procedures for additional information regarding today's visit

## 2025-05-05 ENCOUNTER — ROUTINE PRENATAL (OUTPATIENT)
Dept: OBGYN CLINIC | Facility: CLINIC | Age: 35
End: 2025-05-05
Payer: COMMERCIAL

## 2025-05-05 VITALS
HEIGHT: 66 IN | DIASTOLIC BLOOD PRESSURE: 70 MMHG | BODY MASS INDEX: 36.64 KG/M2 | SYSTOLIC BLOOD PRESSURE: 122 MMHG | WEIGHT: 228 LBS

## 2025-05-05 DIAGNOSIS — O09.299 HISTORY OF INTRAUTERINE GROWTH RESTRICTION IN PRIOR PREGNANCY, CURRENTLY PREGNANT: ICD-10-CM

## 2025-05-05 DIAGNOSIS — Z3A.36 36 WEEKS GESTATION OF PREGNANCY: Primary | ICD-10-CM

## 2025-05-05 DIAGNOSIS — O09.299 HISTORY OF HELLP SYNDROME, CURRENTLY PREGNANT: ICD-10-CM

## 2025-05-05 DIAGNOSIS — Z30.2 REQUEST FOR STERILIZATION: ICD-10-CM

## 2025-05-05 DIAGNOSIS — Z36.89 ENCOUNTER FOR OTHER SPECIFIED ANTENATAL SCREENING: ICD-10-CM

## 2025-05-05 DIAGNOSIS — O34.211 MATERNAL CARE DUE TO LOW TRANSVERSE UTERINE SCAR FROM PREVIOUS CESAREAN DELIVERY: ICD-10-CM

## 2025-05-05 LAB
SL AMB  POCT GLUCOSE, UA: NORMAL
SL AMB POCT URINE PROTEIN: NORMAL

## 2025-05-05 PROCEDURE — PNV: Performed by: OBSTETRICS & GYNECOLOGY

## 2025-05-05 PROCEDURE — 81002 URINALYSIS NONAUTO W/O SCOPE: CPT | Performed by: OBSTETRICS & GYNECOLOGY

## 2025-05-05 NOTE — PROGRESS NOTES
"Routine Prenatal Visit  [facility] - [place] [address], [address], [place] [address]    Assessment/Plan:  Azra is a 34 y.o. year old  at 36w2d who presents for routine prenatal visit.     Assessment & Plan  36 weeks gestation of pregnancy    Orders:    POCT urine dip    Encounter for other specified  screening    Orders:    Strep Gp B Culture    History of HELLP syndrome, currently pregnant         Maternal care due to low transverse uterine scar from previous  delivery     -  repeat C/S scheduled       History of intrauterine growth restriction in prior pregnancy, currently pregnant         Request for sterilization    -  O.R.  informed         Next OB Visit 2 weeks.    Subjective:     CC: Prenatal care    Azra Mahoney is a 34 y.o.  female who presents for routine prenatal care at 36w2d.  Pregnancy ROS: no leakage of fluid, pelvic pain, or vaginal bleeding.  normal fetal movement.    The following portions of the patient's history were reviewed and updated as appropriate: allergies, current medications, past family history, past medical history, obstetric history, gynecologic history, past social history, past surgical history and problem list.      Objective:  /70 (BP Location: Left arm, Patient Position: Sitting, Cuff Size: Large)   Ht 5' 6\" (1.676 m)   Wt 103 kg (228 lb)   BMI 36.80 kg/m²   Pregravid Weight/BMI: 90.7 kg (200 lb) (BMI 32.30)  Current Weight: 103 kg (228 lb)   Total Weight Gain: 12.7 kg (28 lb)   Pre- Vitals      Flowsheet Row Most Recent Value   Prenatal Assessment    Fetal Heart Rate 150   Fundal Height (cm) 36 cm   Movement Present   Presentation Vertex   Prenatal Vitals    Blood Pressure 122/70   Weight - Scale 103 kg (228 lb)   Urine Albumin/Glucose    Dilation/Effacement/Station    Vaginal Drainage    Draining Fluid No   Edema              General: Well appearing, no distress  Abdomen: Soft, gravid, " nontender  Extremities: Non tender.

## 2025-05-07 ENCOUNTER — RESULTS FOLLOW-UP (OUTPATIENT)
Dept: OBGYN CLINIC | Facility: CLINIC | Age: 35
End: 2025-05-07

## 2025-05-09 ENCOUNTER — ULTRASOUND (OUTPATIENT)
Dept: PERINATAL CARE | Facility: OTHER | Age: 35
End: 2025-05-09
Attending: OBSTETRICS & GYNECOLOGY
Payer: COMMERCIAL

## 2025-05-09 VITALS
WEIGHT: 230.8 LBS | SYSTOLIC BLOOD PRESSURE: 112 MMHG | DIASTOLIC BLOOD PRESSURE: 68 MMHG | BODY MASS INDEX: 37.09 KG/M2 | HEART RATE: 89 BPM | HEIGHT: 66 IN

## 2025-05-09 DIAGNOSIS — O09.293 HISTORY OF INTRAUTERINE GROWTH RESTRICTION IN PRIOR PREGNANCY, CURRENTLY PREGNANT IN THIRD TRIMESTER: ICD-10-CM

## 2025-05-09 DIAGNOSIS — O09.813 PREGNANCY RESULTING FROM IN VITRO FERTILIZATION IN THIRD TRIMESTER: ICD-10-CM

## 2025-05-09 DIAGNOSIS — Z3A.36 36 WEEKS GESTATION OF PREGNANCY: ICD-10-CM

## 2025-05-09 DIAGNOSIS — O09.293 HX OF PREECLAMPSIA, PRIOR PREGNANCY, CURRENTLY PREGNANT, THIRD TRIMESTER: Primary | ICD-10-CM

## 2025-05-09 PROCEDURE — 76815 OB US LIMITED FETUS(S): CPT | Performed by: OBSTETRICS & GYNECOLOGY

## 2025-05-09 PROCEDURE — 59025 FETAL NON-STRESS TEST: CPT | Performed by: OBSTETRICS & GYNECOLOGY

## 2025-05-09 NOTE — PROGRESS NOTES
Repeat Non-Stress Testing:    Patient verbalizes +FM. Pt denies ALL:               Leaking of fluid   Contractions   Vaginal bleeding   Decreased fetal movement    Patient is performing daily kick counts. Patient states that she recently has been experiencing itching of her legs, feet and backs of her arms. However, she does state that after applying lotion, she does get relief. Denies any other concerns. She was encouraged to call her OB if the itching worsens. She also has an OB appointment this coming Monday.  Dr. Bravo aware.  NST strip reviewed by Dr. Bravo in-person.

## 2025-05-12 ENCOUNTER — ULTRASOUND (OUTPATIENT)
Dept: PERINATAL CARE | Facility: CLINIC | Age: 35
End: 2025-05-12
Payer: COMMERCIAL

## 2025-05-12 ENCOUNTER — RESULTS FOLLOW-UP (OUTPATIENT)
Dept: OBGYN CLINIC | Facility: CLINIC | Age: 35
End: 2025-05-12

## 2025-05-12 ENCOUNTER — ROUTINE PRENATAL (OUTPATIENT)
Dept: OBGYN CLINIC | Facility: CLINIC | Age: 35
End: 2025-05-12
Payer: COMMERCIAL

## 2025-05-12 VITALS
BODY MASS INDEX: 36.9 KG/M2 | HEIGHT: 66 IN | SYSTOLIC BLOOD PRESSURE: 110 MMHG | WEIGHT: 229.6 LBS | HEART RATE: 64 BPM | DIASTOLIC BLOOD PRESSURE: 60 MMHG

## 2025-05-12 VITALS
DIASTOLIC BLOOD PRESSURE: 84 MMHG | HEIGHT: 66 IN | SYSTOLIC BLOOD PRESSURE: 126 MMHG | BODY MASS INDEX: 36.64 KG/M2 | WEIGHT: 228 LBS

## 2025-05-12 DIAGNOSIS — Z3A.37 37 WEEKS GESTATION OF PREGNANCY: Primary | ICD-10-CM

## 2025-05-12 DIAGNOSIS — O99.282 HYPOTHYROIDISM AFFECTING PREGNANCY IN SECOND TRIMESTER: Primary | ICD-10-CM

## 2025-05-12 DIAGNOSIS — O09.299 HISTORY OF HELLP SYNDROME, CURRENTLY PREGNANT: ICD-10-CM

## 2025-05-12 DIAGNOSIS — L29.9 ITCHING: ICD-10-CM

## 2025-05-12 DIAGNOSIS — Z3A.37 37 WEEKS GESTATION OF PREGNANCY: ICD-10-CM

## 2025-05-12 DIAGNOSIS — E66.09 OTHER OBESITY DUE TO EXCESS CALORIES AFFECTING PREGNANCY IN SECOND TRIMESTER: ICD-10-CM

## 2025-05-12 DIAGNOSIS — O09.813 PREGNANCY RESULTING FROM IN VITRO FERTILIZATION IN THIRD TRIMESTER: ICD-10-CM

## 2025-05-12 DIAGNOSIS — O09.299 HISTORY OF INTRAUTERINE GROWTH RESTRICTION IN PRIOR PREGNANCY, CURRENTLY PREGNANT: ICD-10-CM

## 2025-05-12 DIAGNOSIS — O34.211 MATERNAL CARE DUE TO LOW TRANSVERSE UTERINE SCAR FROM PREVIOUS CESAREAN DELIVERY: ICD-10-CM

## 2025-05-12 DIAGNOSIS — E03.9 HYPOTHYROIDISM AFFECTING PREGNANCY IN SECOND TRIMESTER: Primary | ICD-10-CM

## 2025-05-12 DIAGNOSIS — Z36.89 ENCOUNTER FOR ULTRASOUND TO CHECK FETAL GROWTH: ICD-10-CM

## 2025-05-12 DIAGNOSIS — O09.523 MULTIGRAVIDA OF ADVANCED MATERNAL AGE IN THIRD TRIMESTER: ICD-10-CM

## 2025-05-12 DIAGNOSIS — O99.212 OTHER OBESITY DUE TO EXCESS CALORIES AFFECTING PREGNANCY IN SECOND TRIMESTER: ICD-10-CM

## 2025-05-12 LAB
SL AMB  POCT GLUCOSE, UA: NORMAL
SL AMB POCT URINE PROTEIN: NORMAL

## 2025-05-12 PROCEDURE — PNV: Performed by: OBSTETRICS & GYNECOLOGY

## 2025-05-12 PROCEDURE — 76816 OB US FOLLOW-UP PER FETUS: CPT | Performed by: OBSTETRICS & GYNECOLOGY

## 2025-05-12 PROCEDURE — 59025 FETAL NON-STRESS TEST: CPT | Performed by: OBSTETRICS & GYNECOLOGY

## 2025-05-12 PROCEDURE — 81002 URINALYSIS NONAUTO W/O SCOPE: CPT | Performed by: OBSTETRICS & GYNECOLOGY

## 2025-05-12 PROCEDURE — 99213 OFFICE O/P EST LOW 20 MIN: CPT | Performed by: OBSTETRICS & GYNECOLOGY

## 2025-05-12 NOTE — PROGRESS NOTES
[facility]: Azra was seen today for NST (found under the pregnancy episode) which I reviewed the RN assessment and agree, and fetal growth ultrasound (see ultrasound report under OB procedures tab).       The time spent on this established patient on the encounter date included 2 minutes previsit service time reviewing records and precharting, 5 minutes face-to-face service time counseling regarding results and coordinating care, and  3 minutes charting, totalling 10 minutes.  Please don't hesitate to contact our office with any concerns or questions.  -Kathleen Jefferson MD

## 2025-05-12 NOTE — PROGRESS NOTES
"Routine Prenatal Visit  [facility] [address], [address], [place] [address]    Assessment/Plan:  Azra is a 34 y.o. year old  at 37w2d who presents for routine prenatal visit.     1. Hypothyroidism affecting pregnancy in second trimester  Assessment & Plan:  Most recent TSH 0.58  2. Multigravida of advanced maternal age in third trimester  3. History of HELLP syndrome, currently pregnant  Assessment & Plan:  Normal Bps so far  4. Pregnancy resulting from in vitro fertilization in third trimester  Assessment & Plan:  Cont with  testing  5. Maternal care due to low transverse uterine scar from previous  delivery  Assessment & Plan:  Repeat with salpingectomy scheduled    6. Other obesity due to excess calories affecting pregnancy in second trimester  7. 37 weeks gestation of pregnancy  -     POCT urine dip  8. Itching  -     Bile acids, total; Future  -     Comprehensive metabolic panel; Future  -     Bile acids, total  -     Comprehensive metabolic panel        Subjective:   Azra Mahoney is a 34 y.o.  who presents for routine prenatal care at 37w2d.  Complaints today: Complaining of itching.  LOF: -; VB: -; Contractions: -; FM: +    Objective:  /84 (BP Location: Left arm, Patient Position: Sitting, Cuff Size: Large)   Ht 5' 6\" (1.676 m)   Wt 103 kg (228 lb)   BMI 36.80 kg/m²     General: Well appearing, no distress  Respiratory: Unlabored breathing  Abdomen: Soft, gravid, nontender  Extremities: Warm and well perfused.  Non tender.    Pregravid Weight/BMI: 90.7 kg (200 lb) (BMI 32.30)  Current Weight: 103 kg (228 lb)   Total Weight Gain: 12.7 kg (28 lb)     Pre- Vitals      Flowsheet Row Most Recent Value   Prenatal Assessment    Fetal Heart Rate 143   Fundal Height (cm) 37 cm   Movement Present   Presentation Vertex   Prenatal Vitals    Blood Pressure 126/84   Weight - Scale 103 kg (228 lb)   Urine Albumin/Glucose    Dilation/Effacement/Station  "   Vaginal Drainage    Edema              Maggie Nick DO  5/12/2025 9:21 AM

## 2025-05-12 NOTE — PATIENT INSTRUCTIONS
Thank you for choosing us for your  care today.  If you have any questions about your ultrasound or care, please do not hesitate to contact us or your primary obstetrician.        Some general instructions for your pregnancy are:    Exercise: Aim for 150 minutes per week of regular exercise.  Walking is great!  Nutrition: Choose healthy sources of calcium, iron, and protein.  Avoid ultraprocessed foods and added sugar.  Learn about Preeclampsia: preeclampsia is a common, potentially serious high blood pressure complication in pregnancy.  A blood pressure of 140mmHg (systolic or top number) or 90mmHg (diastolic or bottom number) should be evaluated by your doctor.  Aspirin is sometimes prescribed in early pregnancy to prevent preeclampsia in women with risk factors - ask your obstetrician if you should be on this medication.  For more resources, visit:  https://www.highriskpregnancyinfo.org/preeclampsia  If you smoke, please try to quit completely but also try to reduce your smoking by as much as possible (as soon as possible).  Do not vape.  Please also avoid cannabis products.  Other warning signs to watch out for in pregnancy or postpartum: chest pain, obstructed breathing or shortness of breath, seizures, thoughts of hurting yourself or your baby, bleeding, a painful or swollen leg, fever, or headache (see [facility] Warning Sign[facility]).  If these happen call 911.  Itching is also not normal in pregnancy and if you experience this, especially over your hands and feet, potentially worse at night, notify your doctors.     Kick Counts in Pregnancy   AMBULATORY CARE:   Kick counts  measure how much your baby is moving in your womb. A kick from your baby can be felt as a twist, turn, swish, roll, or jab. It is common to feel your baby kicking at 26 to 28 weeks of pregnancy. You may feel your baby kick as early as 20 weeks of pregnancy. You may want to start counting at 28 weeks.   Contact your  doctor immediately if:   You feel a change in the number of kicks or movements of your baby.      You feel fewer than 10 kicks within 2 hours.      You have questions or concerns about your baby's movements.     Why measure kick counts:  Your baby's movement may provide information about your baby's health. He or she may move less, or not at all, if there are problems. Your baby may move less if he or she is not getting enough oxygen or nutrition from the placenta. Do not smoke while you are pregnant. Smoking decreases the amount of oxygen that gets to your baby. Talk to your healthcare provider if you need help to quit smoking. Tell your healthcare provider as soon as you feel a change in your baby's movements.  When to measure kick counts:   Measure kick counts at the same time every day.       Measure kick counts when your baby is awake and most active. Your baby may be most active in the evening.     How to measure kick counts:  Check that your baby is awake before you measure kick counts. You can wake up your baby by lightly pushing on your belly, walking, or drinking something cold. Your healthcare provider may tell you different ways to measure kick counts. You may be told to do the following:  Use a chart or clock to keep track of the time you start and finish counting.      Sit in a chair or lie on your left side.      Place your hands on the largest part of your belly.      Count until you reach 10 kicks. Write down how much time it takes to count 10 kicks.      It may take 30 minutes to 2 hours to count 10 kicks. It should not take more than 2 hours to count 10 kicks.     Follow up with your doctor as directed:  Write down your questions so you remember to ask them during your visits.   © Copyright [employer]ative 2023 Information is for End User's use only and may not be sold, redistributed or otherwise used for commercial purposes.  The above information is an  only. It is not intended as  medical advice for individual conditions or treatments. Talk to your doctor, nurse or pharmacist before following any medical regimen to see if it is safe and effective for you. Nonstress Test for Pregnancy   WHAT YOU NEED TO KNOW:   What do I need to know about a nonstress test?  A nonstress test measures your baby's heart rate and movements. Nonstress means that no stress will be placed on your baby during the test.  How do I prepare for a nonstress test?  Your healthcare provider will talk to you about how to prepare for this test. Your provider may tell you to eat and drink plenty of liquids before your test. If you smoke, you may be asked not to smoke within 2 hours before the test. Your provider will also tell you which medicines to take or not take on the day of your test.  What will happen during a nonstress test?  You may be asked to lie down or recline back for the test on a bed. One or 2 belts with sensors will be placed around your abdomen. Your baby's heart rate will be recorded with a machine. If your baby does not move, your baby may be asleep. Your healthcare provider may make a noise near your abdomen to try to wake your baby. The test usually takes about 20 minutes, but can take longer if your baby needs to be awakened.        What do I need to know about the test results?  Your baby will be expected to move at least 2 times for a certain amount of time. Your baby's heart rate will be expected to go up by a certain number of beats per minute during movement. If your baby does not move as expected, the test may need to be repeated or you may need other tests.  CARE AGREEMENT:   You have the right to help plan your care. Learn about your health condition and how it may be treated. Discuss treatment options with your healthcare providers to decide what care you want to receive. You always have the right to refuse treatment. The above information is an  only. It is not intended as  medical advice for individual conditions or treatments. Talk to your doctor, nurse or pharmacist before following any medical regimen to see if it is safe and effective for you.  © 2023 Information is for End User's use only and may not be sold, redistributed or otherwise used for commercial purposes. Thank you for choosing us for your  care today.  If you have any questions about your ultrasound or care, please do not hesitate to contact us or your primary obstetrician.        Some general instructions for your pregnancy are:    Exercise: Aim for 150 minutes per week of regular exercise.  Walking is great!  Nutrition: Choose healthy sources of calcium, iron, and protein.  Avoid ultraprocessed foods and added sugar.  Learn about Preeclampsia: preeclampsia is a common, potentially serious high blood pressure complication in pregnancy.  A blood pressure of 140mmHg (systolic or top number) or 90mmHg (diastolic or bottom number) should be evaluated by your doctor.  Aspirin is sometimes prescribed in early pregnancy to prevent preeclampsia in women with risk factors - ask your obstetrician if you should be on this medication.  For more resources, visit:  https://www.highriskpregnancyinfo.org/preeclampsia  If you smoke, please try to quit completely but also try to reduce your smoking by as much as possible (as soon as possible).  Do not vape.  Please also avoid cannabis products.  Other warning signs to watch out for in pregnancy or postpartum: chest pain, obstructed breathing or shortness of breath, seizures, thoughts of hurting yourself or your baby, bleeding, a painful or swollen leg, fever, or headache (see [facility] POST-BIRTH Warning Signs campaign).  If these happen call 911.  Itching is also not normal in pregnancy and if you experience this, especially over your hands and feet, potentially worse at night, notify your doctors.

## 2025-05-12 NOTE — PROGRESS NOTES
Repeat Non-Stress Testing:    Patient verbalizes +FM. Pt denies ALL:               Leaking of fluid   Contractions   Vaginal bleeding   Decreased fetal movement    Patient is performing daily kick counts. Patient has no questions or concerns.   NST strip reviewed by Dr. Jefferson in-person.

## 2025-05-13 ENCOUNTER — RESULTS FOLLOW-UP (OUTPATIENT)
Dept: OBGYN CLINIC | Facility: CLINIC | Age: 35
End: 2025-05-13

## 2025-05-13 LAB
ALBUMIN SERPL-MCNC: 3.2 G/DL (ref 3.6–5.1)
ALBUMIN/GLOB SERPL: 1.3 (CALC) (ref 1–2.5)
ALP SERPL-CCNC: 121 U/L (ref 31–125)
ALT SERPL-CCNC: 18 U/L (ref 6–29)
AST SERPL-CCNC: 19 U/L (ref 10–30)
BILE AC SERPL-SCNC: 4 UMOL/L (ref 0–10)
BILIRUB SERPL-MCNC: 0.3 MG/DL (ref 0.2–1.2)
BUN SERPL-MCNC: 12 MG/DL (ref 7–25)
BUN/CREAT SERPL: ABNORMAL (CALC) (ref 6–22)
CALCIUM SERPL-MCNC: 8.6 MG/DL (ref 8.6–10.2)
CHLORIDE SERPL-SCNC: 107 MMOL/L (ref 98–110)
CO2 SERPL-SCNC: 22 MMOL/L (ref 20–32)
CREAT SERPL-MCNC: 0.84 MG/DL (ref 0.5–0.97)
GFR/BSA.PRED SERPLBLD CYS-BASED-ARV: 92 ML/MIN/1.73M2
GLOBULIN SER CALC-MCNC: 2.4 G/DL (CALC) (ref 1.9–3.7)
GLUCOSE SERPL-MCNC: 131 MG/DL (ref 65–99)
POTASSIUM SERPL-SCNC: 3.9 MMOL/L (ref 3.5–5.3)
PROT SERPL-MCNC: 5.6 G/DL (ref 6.1–8.1)
SODIUM SERPL-SCNC: 137 MMOL/L (ref 135–146)

## 2025-05-19 ENCOUNTER — ROUTINE PRENATAL (OUTPATIENT)
Dept: OBGYN CLINIC | Facility: CLINIC | Age: 35
End: 2025-05-19
Payer: COMMERCIAL

## 2025-05-19 ENCOUNTER — ULTRASOUND (OUTPATIENT)
Dept: PERINATAL CARE | Facility: OTHER | Age: 35
End: 2025-05-19
Attending: OBSTETRICS & GYNECOLOGY
Payer: COMMERCIAL

## 2025-05-19 VITALS
BODY MASS INDEX: 37 KG/M2 | HEIGHT: 66 IN | SYSTOLIC BLOOD PRESSURE: 128 MMHG | WEIGHT: 230.2 LBS | DIASTOLIC BLOOD PRESSURE: 82 MMHG | HEART RATE: 92 BPM

## 2025-05-19 VITALS
SYSTOLIC BLOOD PRESSURE: 130 MMHG | HEIGHT: 66 IN | WEIGHT: 230.2 LBS | BODY MASS INDEX: 37 KG/M2 | DIASTOLIC BLOOD PRESSURE: 82 MMHG

## 2025-05-19 DIAGNOSIS — Z3A.38 38 WEEKS GESTATION OF PREGNANCY: ICD-10-CM

## 2025-05-19 DIAGNOSIS — O09.813 PREGNANCY RESULTING FROM IN VITRO FERTILIZATION IN THIRD TRIMESTER: ICD-10-CM

## 2025-05-19 DIAGNOSIS — E66.09 OTHER OBESITY DUE TO EXCESS CALORIES AFFECTING PREGNANCY IN SECOND TRIMESTER: ICD-10-CM

## 2025-05-19 DIAGNOSIS — O99.283 HYPOTHYROIDISM AFFECTING PREGNANCY IN THIRD TRIMESTER: Primary | ICD-10-CM

## 2025-05-19 DIAGNOSIS — O34.211 MATERNAL CARE DUE TO LOW TRANSVERSE UTERINE SCAR FROM PREVIOUS CESAREAN DELIVERY: ICD-10-CM

## 2025-05-19 DIAGNOSIS — O09.299 HISTORY OF INTRAUTERINE GROWTH RESTRICTION IN PRIOR PREGNANCY, CURRENTLY PREGNANT: ICD-10-CM

## 2025-05-19 DIAGNOSIS — O99.212 OTHER OBESITY DUE TO EXCESS CALORIES AFFECTING PREGNANCY IN SECOND TRIMESTER: ICD-10-CM

## 2025-05-19 DIAGNOSIS — O09.523 MULTIGRAVIDA OF ADVANCED MATERNAL AGE IN THIRD TRIMESTER: ICD-10-CM

## 2025-05-19 DIAGNOSIS — O09.299 HISTORY OF HELLP SYNDROME, CURRENTLY PREGNANT: ICD-10-CM

## 2025-05-19 DIAGNOSIS — E03.9 HYPOTHYROIDISM AFFECTING PREGNANCY IN THIRD TRIMESTER: Primary | ICD-10-CM

## 2025-05-19 DIAGNOSIS — Z30.2 REQUEST FOR STERILIZATION: ICD-10-CM

## 2025-05-19 DIAGNOSIS — O09.813 PREGNANCY RESULTING FROM IN VITRO FERTILIZATION IN THIRD TRIMESTER: Primary | ICD-10-CM

## 2025-05-19 LAB
SL AMB  POCT GLUCOSE, UA: NORMAL
SL AMB POCT URINE PROTEIN: NORMAL

## 2025-05-19 PROCEDURE — 81002 URINALYSIS NONAUTO W/O SCOPE: CPT | Performed by: OBSTETRICS & GYNECOLOGY

## 2025-05-19 PROCEDURE — 59025 FETAL NON-STRESS TEST: CPT | Performed by: OBSTETRICS & GYNECOLOGY

## 2025-05-19 PROCEDURE — PNV: Performed by: OBSTETRICS & GYNECOLOGY

## 2025-05-19 PROCEDURE — 76815 OB US LIMITED FETUS(S): CPT | Performed by: OBSTETRICS & GYNECOLOGY

## 2025-05-19 NOTE — PROGRESS NOTES
"Routine Prenatal Visit  [facility] [address], [address], [place] [address]    Assessment/Plan:  Azra is a 35 y.o. year old  at 38w2d who presents for routine prenatal visit.     1. Hypothyroidism affecting pregnancy in third trimester  2. Multigravida of advanced maternal age in third trimester  3. History of HELLP syndrome, currently pregnant  4. Pregnancy resulting from in vitro fertilization in third trimester  Assessment & Plan:  Has NST today  5. Maternal care due to low transverse uterine scar from previous  delivery  Assessment & Plan:  Repeat for next week  6. Other obesity due to excess calories affecting pregnancy in second trimester  7. History of intrauterine growth restriction in prior pregnancy, currently pregnant  8. Request for sterilization  9. 38 weeks gestation of pregnancy  -     POCT urine dip        Subjective:   Azra Mahoney is a 35 y.o.  who presents for routine prenatal care at 38w2d.  Complaints today: Still reports itching, however bile acids from last week are normal  LOF: -; VB: -; Contractions: -; FM: +    Objective:  /82 (BP Location: Left arm, Patient Position: Sitting, Cuff Size: Standard)   Ht 5' 6\" (1.676 m)   Wt 104 kg (230 lb 3.2 oz)   BMI 37.16 kg/m²     General: Well appearing, no distress  Respiratory: Unlabored breathing  Abdomen: Soft, gravid, nontender  Extremities: Warm and well perfused.  Non tender.    Pregravid Weight/BMI: 90.7 kg (200 lb) (BMI 32.30)  Current Weight: 104 kg (230 lb 3.2 oz)   Total Weight Gain: 13.7 kg (30 lb 3.2 oz)     Pre- Vitals      Flowsheet Row Most Recent Value   Prenatal Assessment    Fetal Heart Rate 154   Fundal Height (cm) 38 cm   Movement Present   Presentation Vertex   Prenatal Vitals    Blood Pressure 130/82   Weight - Scale 104 kg (230 lb 3.2 oz)   Urine Albumin/Glucose    Dilation/Effacement/Station    Vaginal Drainage    Edema              Shil MONTEZ Nick, DO  2025 " 10:33 AM

## 2025-05-26 PROCEDURE — NC001 PR NO CHARGE: Performed by: STUDENT IN AN ORGANIZED HEALTH CARE EDUCATION/TRAINING PROGRAM

## 2025-05-26 PROCEDURE — 4A1HXCZ MONITORING OF PRODUCTS OF CONCEPTION, CARDIAC RATE, EXTERNAL APPROACH: ICD-10-PCS | Performed by: STUDENT IN AN ORGANIZED HEALTH CARE EDUCATION/TRAINING PROGRAM

## 2025-05-26 NOTE — H&P
History & Physical - OB/GYN   Azra Mahoney 35 y.o. female MRN: [id number]  Unit/Bed#:  Encounter: [id number]    Assessment:   35 y.o.  at 39w2d for scheduled repeat  section.    Plan:   1. Admit to L&D  2. Place IV and IV fluids  3. Labs: CBC, RPR, type and screen  4. Continue NPO  5. Fetal monitoring and toco  6. Anesthesia evaluation  7. Preop Antibiotics ordered    Hypothyroidism affecting pregnancy in third trimester  - Continue home levothyroxine    Maternal care due to low transverse uterine scar from previous  delivery  - Mode of delivery has been discussed in the outpatient setting, including option for trial of labor after  section (TOLAC) versus scheduled repeat  delivery. Patient declines TOLAC and requests to proceed with scheduled repeat  section.     Request for sterilization  - Will proceed with bilateral salpingectomy at time of  section, per patient request.            ________________________________________________________  35 y.o. yo  at 39w2d with JULIANNE of 2025, by Embryo Transfer.    She is a patient of [facility] [place].    Chief complaint:  Scheduled     HPI:  35 y.o. yo  at 39w2d with Estimated Date of Delivery: 25 admitted for scheduled repeat  section with permanent sterilization.       Pregnancy Complications:   Problems (from 24 to present)       Problem Noted Diagnosed Resolved    Request for sterilization 2025 by Jeannie Perez DO  No    Overview Signed 2025  2:01 PM by Jeannie Perez DO   Pt desires bilateral salpingectomy         Obesity affecting pregnancy 2024 by Chacorta Lewis MD  No    History of intrauterine growth restriction in prior pregnancy, currently pregnant 2024 by Chacorta Lewis MD  No    Maternal care due to low transverse uterine scar from previous  delivery 2024 by Caleb Burch MD  No    Overview Signed 2024   3:43 PM by Caelb Burch MD   33 weeks due to HELLP syndrome         Hypothyroidism affecting pregnancy in third trimester 2024 by Horace Morelos MD  No    Overview Addendum 3/10/2025 11:52 AM by BEENA Adams   Previously managed by endocrine  10/29/24 TSH 2.3 FT4 1.23  1/10/2025 TSH 2.4         Multigravida of advanced maternal age in third trimester 2024 by Horace Morelos MD  No    History of HELLP syndrome, currently pregnant 2024 by Horace Morelos MD  No    Overview Addendum 3/10/2025 11:37 AM by BEENA Adams   33 weeks with IUGR  Baseline labs done by PAT. In scanned documents, ASA  3/10/2025 Repeat labs ordered by MFM today  Weekly APFS @ 32 weeks         Pregnancy resulting from in vitro fertilization in third trimester 2024 by Horace Morelos MD  No    Overview Signed 2024  3:59 PM by Caleb Burch MD   Preimplantation genetics done         30 weeks gestation of pregnancy 2024 by Chacorta Lewis MD  2025 by Jeannie Perez DO            PMH:  Past Medical History[1]    PSH:  Past Surgical History[2]    Social Hx:  Social History[3]     OB Hx:  OB History    Para Term  AB Living   3 1 0 1 1 1   SAB IAB Ectopic Multiple Live Births   1 0 0 0 1      # Outcome Date GA Lbr Vladimir/2nd Weight Sex Type Anes PTL Lv   3 Current            2 SAB 2023           1  22 33w0d  1673 g (3 lb 11 oz) M CS-LTranv Spinal N ALEX      Birth Comments: Severe Preeclampsia, HELLP Syndrome, Severe IUGR      Complications: HELLP syndrome (HELLP), third trimester, Severe preeclampsia       Meds:  Medications Ordered Prior to Encounter[4]    Allergies:  Allergies[5]    Labs:  ABO Grouping   Date Value Ref Range Status   2024 A  Final      Rh Type   Date Value Ref Range Status   2024 RH(D) POSITIVE  Final     Comment:        For additional information, please refer to   [email or web address]   (This link is  being provided for informational/  educational purposes only.)        Rh Type   Date Value Ref Range Status   11/23/2024 RH(D) POSITIVE  Final     Comment:        For additional information, please refer to   http://[email or web address]/[email or web address]   (This link is being provided for informational/  educational purposes only.)       HIV AG/AB, 4th Gen   Date Value Ref Range Status   11/23/2024 NON-REACTIVE NON-REACTIVE Final     Comment:     HIV-1 antigen and HIV-1/HIV-2 antibodies were not  detected. There is no laboratory evidence of HIV  infection.     PLEASE NOTE: This information has been disclosed to  you from records whose confidentiality may be  protected by state law.  If your state requires such  protection, then the state law prohibits you from  making any further disclosure of the information  without the specific written consent of the person  to whom it pertains, or as otherwise permitted by law.  A general authorization for the release of medical or  other information is NOT sufficient for this purpose.      For additional information please refer to  [email or web address]  (This link is being provided for informational/  educational purposes only.)        The performance of this assay has not been clinically  validated in patients less than 2 years old.          Hepatitis B Surface Ag   Date Value Ref Range Status   11/23/2024 negative  Final     HEP C AB   Date Value Ref Range Status   11/23/2024 NON-REACTIVE NON-REACTIVE Final     Comment:        HCV antibody was non-reactive. There is no laboratory   evidence of HCV infection.     In most cases, no further action is required. However,  if recent HCV exposure is suspected, a test for HCV RNA  (test code 62412) is suggested.     For additional information please refer to  [email or web address] [email or web address]/[email or web address]  (This link is being provided for informational/  educational purposes only.)          RODRIGO  "  Date Value Ref Range Status   2025 NON-REACTIVE NON-REACTIVE Final     External Rubella IGG Quantitation   Date Value Ref Range Status   2024 immune  Final     Glucose   Date Value Ref Range Status   2025 106 <135 mg/dL Final     No results found for: \"LJBIYGP1RR\"  No results found for: \"STREPGRPB\"    Physical Exam:  Vitals and physical exam will be done day of admission    Chacorta Lewis MD  2025 6:49 PM           [1]   Past Medical History:  Diagnosis Date    Female infertility     Hypothyroidism     Miscarriage 2023    Polycystic ovary syndrome 2004   [2]   Past Surgical History:  Procedure Laterality Date     SECTION      LAPAROSCOPY  2020    Confirmed PCOS and underwent Ovarian drilling perfomed,    MOUTH SURGERY Bilateral     Removal of 7 baby teeth at age 12    WISDOM TOOTH EXTRACTION Bilateral    [3]   Social History  Tobacco Use    Smoking status: Never    Smokeless tobacco: Never   Vaping Use    Vaping status: Never Used   Substance Use Topics    Alcohol use: Not Currently    Drug use: Never   [4]   No current facility-administered medications on file prior to encounter.     Current Outpatient Medications on File Prior to Encounter   Medication Sig Dispense Refill    levothyroxine 125 mcg tablet       Prenatal MV-Min-Fe Fum-FA-DHA (PRENATAL 1 PO)      [5]   Allergies  Allergen Reactions    Kiwi Extract - Food Allergy Anaphylaxis    Bee Venom Swelling     "

## 2025-05-26 NOTE — ASSESSMENT & PLAN NOTE
- Mode of delivery has been discussed in the outpatient setting, including option for trial of labor after  section (TOLAC) versus scheduled repeat  delivery. Patient declines TOLAC and requests to proceed with scheduled repeat  section.

## 2025-05-27 ENCOUNTER — ANESTHESIA EVENT (INPATIENT)
Dept: LABOR AND DELIVERY | Facility: HOSPITAL | Age: 35
End: 2025-05-27
Payer: COMMERCIAL

## 2025-05-27 ENCOUNTER — HOSPITAL ENCOUNTER (INPATIENT)
Facility: HOSPITAL | Age: 35
LOS: 2 days | Discharge: HOME | End: 2025-05-29
Attending: STUDENT IN AN ORGANIZED HEALTH CARE EDUCATION/TRAINING PROGRAM | Admitting: STUDENT IN AN ORGANIZED HEALTH CARE EDUCATION/TRAINING PROGRAM
Payer: COMMERCIAL

## 2025-05-27 ENCOUNTER — ANESTHESIA (INPATIENT)
Dept: LABOR AND DELIVERY | Facility: HOSPITAL | Age: 35
End: 2025-05-27
Payer: COMMERCIAL

## 2025-05-27 DIAGNOSIS — O09.813 PREGNANCY RESULTING FROM IN VITRO FERTILIZATION IN THIRD TRIMESTER: ICD-10-CM

## 2025-05-27 DIAGNOSIS — O34.211 MATERNAL CARE DUE TO LOW TRANSVERSE UTERINE SCAR FROM PREVIOUS CESAREAN DELIVERY: ICD-10-CM

## 2025-05-27 DIAGNOSIS — Z98.891 S/P REPEAT LOW TRANSVERSE C-SECTION: ICD-10-CM

## 2025-05-27 LAB
ABO GROUP BLD: NORMAL
ALBUMIN SERPL BCG-MCNC: 3.3 G/DL (ref 3.5–5)
ALP SERPL-CCNC: 127 U/L (ref 34–104)
ALT SERPL W P-5'-P-CCNC: 13 U/L (ref 7–52)
ANION GAP SERPL CALCULATED.3IONS-SCNC: 8 MMOL/L (ref 4–13)
AST SERPL W P-5'-P-CCNC: 18 U/L (ref 13–39)
BASE EXCESS BLDCOA CALC-SCNC: -6.8 MMOL/L (ref 3–11)
BASE EXCESS BLDCOV CALC-SCNC: -6.1 MMOL/L (ref 1–9)
BILIRUB SERPL-MCNC: 0.29 MG/DL (ref 0.2–1)
BLD GP AB SCN SERPL QL: NEGATIVE
BUN SERPL-MCNC: 11 MG/DL (ref 5–25)
CALCIUM ALBUM COR SERPL-MCNC: 9.5 MG/DL (ref 8.3–10.1)
CALCIUM SERPL-MCNC: 8.9 MG/DL (ref 8.4–10.2)
CHLORIDE SERPL-SCNC: 109 MMOL/L (ref 96–108)
CO2 SERPL-SCNC: 21 MMOL/L (ref 21–32)
CREAT SERPL-MCNC: 0.87 MG/DL (ref 0.6–1.3)
CREAT UR-MCNC: 186.2 MG/DL
ERYTHROCYTE [DISTWIDTH] IN BLOOD BY AUTOMATED COUNT: 13 % (ref 11.6–15.1)
GFR SERPL CREATININE-BSD FRML MDRD: 85 ML/MIN/1.73SQ M
GLUCOSE SERPL-MCNC: 79 MG/DL (ref 65–140)
HCO3 BLDCOA-SCNC: 22.2 MMOL/L (ref 17.3–27.3)
HCO3 BLDCOV-SCNC: 21.7 MMOL/L (ref 12.2–28.6)
HCT VFR BLD AUTO: 35.5 % (ref 34.8–46.1)
HGB BLD-MCNC: 11.4 G/DL (ref 11.5–15.4)
HOLD SPECIMEN: NORMAL
MCH RBC QN AUTO: 27.3 PG (ref 26.8–34.3)
MCHC RBC AUTO-ENTMCNC: 32.1 G/DL (ref 31.4–37.4)
MCV RBC AUTO: 85 FL (ref 82–98)
O2 CT VFR BLDCOA CALC: 5.2 ML/DL
OXYHGB MFR BLDCOA: 19.8 %
OXYHGB MFR BLDCOV: 38.1 %
PCO2 BLDCOA: 57.3 MM[HG] (ref 30–60)
PCO2 BLDCOV: 50.2 MM HG (ref 27–43)
PH BLDCOA: 7.21 [PH] (ref 7.23–7.43)
PH BLDCOV: 7.25 [PH] (ref 7.19–7.49)
PLATELET # BLD AUTO: 163 THOUSANDS/UL (ref 149–390)
PMV BLD AUTO: 13 FL (ref 8.9–12.7)
PO2 BLDCOA: 13.2 MM HG (ref 5–25)
PO2 BLDCOV: 19.2 MM HG (ref 15–45)
POTASSIUM SERPL-SCNC: 4.3 MMOL/L (ref 3.5–5.3)
PROT SERPL-MCNC: 6.1 G/DL (ref 6.4–8.4)
PROT UR-MCNC: 24.7 MG/DL
PROT/CREAT UR: 0.1 MG/G{CREAT}
RBC # BLD AUTO: 4.18 MILLION/UL (ref 3.81–5.12)
RH BLD: POSITIVE
SAO2 % BLDCOV: 10.2 ML/DL
SODIUM SERPL-SCNC: 138 MMOL/L (ref 135–147)
SPECIMEN EXPIRATION DATE: NORMAL
WBC # BLD AUTO: 6.27 THOUSAND/UL (ref 4.31–10.16)

## 2025-05-27 PROCEDURE — 86901 BLOOD TYPING SEROLOGIC RH(D): CPT | Performed by: STUDENT IN AN ORGANIZED HEALTH CARE EDUCATION/TRAINING PROGRAM

## 2025-05-27 PROCEDURE — 86780 TREPONEMA PALLIDUM: CPT | Performed by: STUDENT IN AN ORGANIZED HEALTH CARE EDUCATION/TRAINING PROGRAM

## 2025-05-27 PROCEDURE — 86900 BLOOD TYPING SEROLOGIC ABO: CPT | Performed by: STUDENT IN AN ORGANIZED HEALTH CARE EDUCATION/TRAINING PROGRAM

## 2025-05-27 PROCEDURE — 94762 N-INVAS EAR/PLS OXIMTRY CONT: CPT

## 2025-05-27 PROCEDURE — 80053 COMPREHEN METABOLIC PANEL: CPT | Performed by: STUDENT IN AN ORGANIZED HEALTH CARE EDUCATION/TRAINING PROGRAM

## 2025-05-27 PROCEDURE — 86850 RBC ANTIBODY SCREEN: CPT | Performed by: STUDENT IN AN ORGANIZED HEALTH CARE EDUCATION/TRAINING PROGRAM

## 2025-05-27 PROCEDURE — 84156 ASSAY OF PROTEIN URINE: CPT | Performed by: STUDENT IN AN ORGANIZED HEALTH CARE EDUCATION/TRAINING PROGRAM

## 2025-05-27 PROCEDURE — 58611 LIGATE OVIDUCT(S) ADD-ON: CPT | Performed by: STUDENT IN AN ORGANIZED HEALTH CARE EDUCATION/TRAINING PROGRAM

## 2025-05-27 PROCEDURE — 85027 COMPLETE CBC AUTOMATED: CPT | Performed by: STUDENT IN AN ORGANIZED HEALTH CARE EDUCATION/TRAINING PROGRAM

## 2025-05-27 PROCEDURE — 94760 N-INVAS EAR/PLS OXIMETRY 1: CPT

## 2025-05-27 PROCEDURE — 0UB70ZZ EXCISION OF BILATERAL FALLOPIAN TUBES, OPEN APPROACH: ICD-10-PCS | Performed by: STUDENT IN AN ORGANIZED HEALTH CARE EDUCATION/TRAINING PROGRAM

## 2025-05-27 PROCEDURE — 82805 BLOOD GASES W/O2 SATURATION: CPT | Performed by: STUDENT IN AN ORGANIZED HEALTH CARE EDUCATION/TRAINING PROGRAM

## 2025-05-27 PROCEDURE — 88302 TISSUE EXAM BY PATHOLOGIST: CPT | Performed by: PATHOLOGY

## 2025-05-27 PROCEDURE — 59510 CESAREAN DELIVERY: CPT | Performed by: STUDENT IN AN ORGANIZED HEALTH CARE EDUCATION/TRAINING PROGRAM

## 2025-05-27 PROCEDURE — 82570 ASSAY OF URINE CREATININE: CPT | Performed by: STUDENT IN AN ORGANIZED HEALTH CARE EDUCATION/TRAINING PROGRAM

## 2025-05-27 PROCEDURE — 88307 TISSUE EXAM BY PATHOLOGIST: CPT | Performed by: PATHOLOGY

## 2025-05-27 RX ORDER — ONDANSETRON 2 MG/ML
INJECTION INTRAMUSCULAR; INTRAVENOUS AS NEEDED
Status: DISCONTINUED | OUTPATIENT
Start: 2025-05-27 | End: 2025-05-27

## 2025-05-27 RX ORDER — DIPHENHYDRAMINE HYDROCHLORIDE 50 MG/ML
25 INJECTION, SOLUTION INTRAMUSCULAR; INTRAVENOUS EVERY 6 HOURS PRN
Status: DISCONTINUED | OUTPATIENT
Start: 2025-05-27 | End: 2025-05-29 | Stop reason: HOSPADM

## 2025-05-27 RX ORDER — CITRIC ACID/SODIUM CITRATE 334-500MG
30 SOLUTION, ORAL ORAL ONCE
Status: COMPLETED | OUTPATIENT
Start: 2025-05-27 | End: 2025-05-27

## 2025-05-27 RX ORDER — CALCIUM CARBONATE 500 MG/1
1000 TABLET, CHEWABLE ORAL 3 TIMES DAILY PRN
Status: DISCONTINUED | OUTPATIENT
Start: 2025-05-27 | End: 2025-05-29 | Stop reason: HOSPADM

## 2025-05-27 RX ORDER — HYDROMORPHONE HCL/PF 1 MG/ML
0.4 SYRINGE (ML) INJECTION ONCE AS NEEDED
Status: DISCONTINUED | OUTPATIENT
Start: 2025-05-27 | End: 2025-05-27

## 2025-05-27 RX ORDER — ACETAMINOPHEN 325 MG/1
650 TABLET ORAL EVERY 4 HOURS PRN
Status: DISCONTINUED | OUTPATIENT
Start: 2025-05-27 | End: 2025-05-29 | Stop reason: HOSPADM

## 2025-05-27 RX ORDER — NALOXONE HYDROCHLORIDE 0.4 MG/ML
0.1 INJECTION, SOLUTION INTRAMUSCULAR; INTRAVENOUS; SUBCUTANEOUS
Status: ACTIVE | OUTPATIENT
Start: 2025-05-27 | End: 2025-05-28

## 2025-05-27 RX ORDER — CEFAZOLIN SODIUM 2 G/50ML
2000 SOLUTION INTRAVENOUS ONCE
Status: COMPLETED | OUTPATIENT
Start: 2025-05-27 | End: 2025-05-27

## 2025-05-27 RX ORDER — OXYCODONE HYDROCHLORIDE 5 MG/1
5 TABLET ORAL EVERY 4 HOURS PRN
Refills: 0 | Status: DISCONTINUED | OUTPATIENT
Start: 2025-05-27 | End: 2025-05-29 | Stop reason: HOSPADM

## 2025-05-27 RX ORDER — ONDANSETRON 2 MG/ML
4 INJECTION INTRAMUSCULAR; INTRAVENOUS EVERY 8 HOURS PRN
Status: DISCONTINUED | OUTPATIENT
Start: 2025-05-27 | End: 2025-05-29 | Stop reason: HOSPADM

## 2025-05-27 RX ORDER — DEXAMETHASONE SODIUM PHOSPHATE 10 MG/ML
INJECTION, SOLUTION INTRAMUSCULAR; INTRAVENOUS AS NEEDED
Status: DISCONTINUED | OUTPATIENT
Start: 2025-05-27 | End: 2025-05-27

## 2025-05-27 RX ORDER — IBUPROFEN 600 MG/1
600 TABLET, FILM COATED ORAL EVERY 6 HOURS
Status: DISCONTINUED | OUTPATIENT
Start: 2025-05-28 | End: 2025-05-29 | Stop reason: HOSPADM

## 2025-05-27 RX ORDER — OXYTOCIN/0.9 % SODIUM CHLORIDE 30/500 ML
62.5 PLASTIC BAG, INJECTION (ML) INTRAVENOUS ONCE
Status: COMPLETED | OUTPATIENT
Start: 2025-05-27 | End: 2025-05-27

## 2025-05-27 RX ORDER — FENTANYL CITRATE 50 UG/ML
INJECTION, SOLUTION INTRAMUSCULAR; INTRAVENOUS AS NEEDED
Status: DISCONTINUED | OUTPATIENT
Start: 2025-05-27 | End: 2025-05-27

## 2025-05-27 RX ORDER — KETOROLAC TROMETHAMINE 30 MG/ML
INJECTION, SOLUTION INTRAMUSCULAR; INTRAVENOUS AS NEEDED
Status: DISCONTINUED | OUTPATIENT
Start: 2025-05-27 | End: 2025-05-27

## 2025-05-27 RX ORDER — FENTANYL CITRATE/PF 50 MCG/ML
25 SYRINGE (ML) INJECTION
Status: DISCONTINUED | OUTPATIENT
Start: 2025-05-27 | End: 2025-05-27

## 2025-05-27 RX ORDER — DIPHENHYDRAMINE HYDROCHLORIDE 50 MG/ML
INJECTION, SOLUTION INTRAMUSCULAR; INTRAVENOUS AS NEEDED
Status: DISCONTINUED | OUTPATIENT
Start: 2025-05-27 | End: 2025-05-27

## 2025-05-27 RX ORDER — NALBUPHINE HYDROCHLORIDE 10 MG/ML
5 INJECTION INTRAMUSCULAR; INTRAVENOUS; SUBCUTANEOUS
Status: ACTIVE | OUTPATIENT
Start: 2025-05-27 | End: 2025-05-28

## 2025-05-27 RX ORDER — ACETAMINOPHEN 325 MG/1
650 TABLET ORAL EVERY 6 HOURS SCHEDULED
Status: COMPLETED | OUTPATIENT
Start: 2025-05-27 | End: 2025-05-28

## 2025-05-27 RX ORDER — SIMETHICONE 80 MG
80 TABLET,CHEWABLE ORAL 4 TIMES DAILY PRN
Status: DISCONTINUED | OUTPATIENT
Start: 2025-05-27 | End: 2025-05-29 | Stop reason: HOSPADM

## 2025-05-27 RX ORDER — KETOROLAC TROMETHAMINE 30 MG/ML
30 INJECTION, SOLUTION INTRAMUSCULAR; INTRAVENOUS EVERY 6 HOURS SCHEDULED
Status: COMPLETED | OUTPATIENT
Start: 2025-05-27 | End: 2025-05-28

## 2025-05-27 RX ORDER — ENOXAPARIN SODIUM 100 MG/ML
40 INJECTION SUBCUTANEOUS DAILY
Status: DISCONTINUED | OUTPATIENT
Start: 2025-05-28 | End: 2025-05-29 | Stop reason: HOSPADM

## 2025-05-27 RX ORDER — BUPIVACAINE HYDROCHLORIDE 7.5 MG/ML
INJECTION, SOLUTION INTRASPINAL AS NEEDED
Status: DISCONTINUED | OUTPATIENT
Start: 2025-05-27 | End: 2025-05-27

## 2025-05-27 RX ORDER — OXYTOCIN/0.9 % SODIUM CHLORIDE 30/500 ML
PLASTIC BAG, INJECTION (ML) INTRAVENOUS CONTINUOUS PRN
Status: DISCONTINUED | OUTPATIENT
Start: 2025-05-27 | End: 2025-05-27

## 2025-05-27 RX ORDER — OXYCODONE HYDROCHLORIDE 5 MG/1
10 TABLET ORAL EVERY 4 HOURS PRN
Refills: 0 | Status: DISCONTINUED | OUTPATIENT
Start: 2025-05-27 | End: 2025-05-29 | Stop reason: HOSPADM

## 2025-05-27 RX ORDER — SODIUM CHLORIDE, SODIUM LACTATE, POTASSIUM CHLORIDE, CALCIUM CHLORIDE 600; 310; 30; 20 MG/100ML; MG/100ML; MG/100ML; MG/100ML
INJECTION, SOLUTION INTRAVENOUS CONTINUOUS PRN
Status: DISCONTINUED | OUTPATIENT
Start: 2025-05-27 | End: 2025-05-27

## 2025-05-27 RX ORDER — MORPHINE SULFATE 0.5 MG/ML
INJECTION, SOLUTION EPIDURAL; INTRATHECAL; INTRAVENOUS AS NEEDED
Status: DISCONTINUED | OUTPATIENT
Start: 2025-05-27 | End: 2025-05-27

## 2025-05-27 RX ORDER — SODIUM CHLORIDE, SODIUM LACTATE, POTASSIUM CHLORIDE, CALCIUM CHLORIDE 600; 310; 30; 20 MG/100ML; MG/100ML; MG/100ML; MG/100ML
125 INJECTION, SOLUTION INTRAVENOUS CONTINUOUS
Status: DISCONTINUED | OUTPATIENT
Start: 2025-05-27 | End: 2025-05-28

## 2025-05-27 RX ORDER — DOCUSATE SODIUM 100 MG/1
100 CAPSULE, LIQUID FILLED ORAL 2 TIMES DAILY PRN
Status: DISCONTINUED | OUTPATIENT
Start: 2025-05-27 | End: 2025-05-29 | Stop reason: HOSPADM

## 2025-05-27 RX ADMIN — SODIUM CITRATE AND CITRIC ACID MONOHYDRATE 30 ML: 500; 334 SOLUTION ORAL at 09:35

## 2025-05-27 RX ADMIN — Medication 30 MILLI-UNITS/MIN: at 10:51

## 2025-05-27 RX ADMIN — DIPHENHYDRAMINE HYDROCHLORIDE 12.5 MG: 50 INJECTION INTRAMUSCULAR; INTRAVENOUS at 10:57

## 2025-05-27 RX ADMIN — SODIUM CHLORIDE, SODIUM LACTATE, POTASSIUM CHLORIDE, AND CALCIUM CHLORIDE: .6; .31; .03; .02 INJECTION, SOLUTION INTRAVENOUS at 10:15

## 2025-05-27 RX ADMIN — KETOROLAC TROMETHAMINE 30 MG: 30 INJECTION, SOLUTION INTRAMUSCULAR; INTRAVENOUS at 23:20

## 2025-05-27 RX ADMIN — SODIUM CHLORIDE, SODIUM LACTATE, POTASSIUM CHLORIDE, AND CALCIUM CHLORIDE 500 ML: .6; .31; .03; .02 INJECTION, SOLUTION INTRAVENOUS at 17:39

## 2025-05-27 RX ADMIN — BUPIVACAINE HYDROCHLORIDE IN DEXTROSE 1.6 ML: 7.5 INJECTION, SOLUTION SUBARACHNOID at 10:26

## 2025-05-27 RX ADMIN — SODIUM CHLORIDE, SODIUM LACTATE, POTASSIUM CHLORIDE, AND CALCIUM CHLORIDE 1000 ML: .6; .31; .03; .02 INJECTION, SOLUTION INTRAVENOUS at 08:46

## 2025-05-27 RX ADMIN — ACETAMINOPHEN 650 MG: 325 TABLET, FILM COATED ORAL at 19:32

## 2025-05-27 RX ADMIN — KETOROLAC TROMETHAMINE 30 MG: 30 INJECTION, SOLUTION INTRAMUSCULAR; INTRAVENOUS at 11:25

## 2025-05-27 RX ADMIN — DEXAMETHASONE SODIUM PHOSPHATE 10 MG: 10 INJECTION, SOLUTION INTRAMUSCULAR; INTRAVENOUS at 10:51

## 2025-05-27 RX ADMIN — PHENYLEPHRINE HYDROCHLORIDE 40 MCG/MIN: 10 INJECTION, SOLUTION INTRAVENOUS at 10:26

## 2025-05-27 RX ADMIN — CEFAZOLIN SODIUM 2000 MG: 2 SOLUTION INTRAVENOUS at 10:22

## 2025-05-27 RX ADMIN — ACETAMINOPHEN 650 MG: 325 TABLET, FILM COATED ORAL at 13:14

## 2025-05-27 RX ADMIN — KETOROLAC TROMETHAMINE 30 MG: 30 INJECTION, SOLUTION INTRAMUSCULAR; INTRAVENOUS at 18:28

## 2025-05-27 RX ADMIN — MORPHINE SULFATE 0.15 MG: 0.5 INJECTION EPIDURAL; INTRATHECAL; INTRAVENOUS at 10:26

## 2025-05-27 RX ADMIN — Medication 62.5 MILLI-UNITS/MIN: at 12:15

## 2025-05-27 RX ADMIN — ONDANSETRON 4 MG: 2 INJECTION INTRAMUSCULAR; INTRAVENOUS at 10:51

## 2025-05-27 RX ADMIN — FENTANYL CITRATE 15 MCG: 50 INJECTION, SOLUTION INTRAMUSCULAR; INTRAVENOUS at 10:26

## 2025-05-27 RX ADMIN — SODIUM CHLORIDE, SODIUM LACTATE, POTASSIUM CHLORIDE, AND CALCIUM CHLORIDE 500 ML: .6; .31; .03; .02 INJECTION, SOLUTION INTRAVENOUS at 23:47

## 2025-05-27 NOTE — PLAN OF CARE
Problem: PAIN - ADULT  Goal: Verbalizes/displays adequate comfort level or baseline comfort level  Description: Interventions:  - Encourage patient to monitor pain and request assistance  - Assess pain using appropriate pain scale  - Administer analgesics as ordered based on type and severity of pain and evaluate response  - Implement non-pharmacological measures as appropriate and evaluate response  - Consider cultural and social influences on pain and pain management  - Notify physician/advanced practitioner if interventions unsuccessful or patient reports new pain  - Educate patient/family on pain management process including their role and importance of  reporting pain   - Provide non-pharmacologic/complimentary pain relief interventions  5/27/2025 1227 by Monserrat Barrios RN  Outcome: Progressing  5/27/2025 0923 by Monserrat Barrios RN  Outcome: Progressing     Problem: INFECTION - ADULT  Goal: Absence or prevention of progression during hospitalization  Description: INTERVENTIONS:  - Assess and monitor for signs and symptoms of infection  - Monitor lab/diagnostic results  - Monitor all insertion sites, i.e. indwelling lines, tubes, and drains  - Monitor endotracheal if appropriate and nasal secretions for changes in amount and color  - Noonan appropriate cooling/warming therapies per order  - Administer medications as ordered  - Instruct and encourage patient and family to use good hand hygiene technique  - Identify and instruct in appropriate isolation precautions for identified infection/condition  5/27/2025 1227 by Monserrat Barrios RN  Outcome: Progressing  5/27/2025 0923 by Monserrat Barrios RN  Outcome: Progressing  Goal: Absence of fever/infection during neutropenic period  Description: INTERVENTIONS:  - Monitor WBC  - Perform strict hand hygiene  - Limit to healthy visitors only  - No plants, dried, fresh or silk flowers with philip in patient room  5/27/2025 1227 by Monserrat Barrios RN  Outcome:  Progressing  5/27/2025 0923 by Monserrat Barrios RN  Outcome: Progressing     Problem: SAFETY ADULT  Goal: Patient will remain free of falls  Description: INTERVENTIONS:  - Educate patient/family on patient safety including physical limitations  - Instruct patient to call for assistance with activity   - Consider consulting OT/PT to assist with strengthening/mobility based on AM PAC & JH-HLM score  - Consult OT/PT to assist with strengthening/mobility   - Keep Call bell within reach  - Keep bed low and locked with side rails adjusted as appropriate  - Keep care items and personal belongings within reach  - Initiate and maintain comfort rounds  - Make Fall Risk Sign visible to staff  - Offer Toileting every  Hours, in advance of need  - Initiate/Maintain alarm  - Obtain necessary fall risk management equipment:   - Apply yellow socks and bracelet for high fall risk patients  - Consider moving patient to room near nurses station  5/27/2025 1227 by Monserrat Barrios RN  Outcome: Progressing  5/27/2025 0923 by Monserrat Barrios RN  Outcome: Progressing  Goal: Maintain or return to baseline ADL function  Description: INTERVENTIONS:  -  Assess patient's ability to carry out ADLs; assess patient's baseline for ADL function and identify physical deficits which impact ability to perform ADLs (bathing, care of mouth/teeth, toileting, grooming, dressing, etc.)  - Assess/evaluate cause of self-care deficits   - Assess range of motion  - Assess patient's mobility; develop plan if impaired  - Assess patient's need for assistive devices and provide as appropriate  - Encourage maximum independence but intervene and supervise when necessary  - Involve family in performance of ADLs  - Assess for home care needs following discharge   - Consider OT consult to assist with ADL evaluation and planning for discharge  - Provide patient education as appropriate  - Monitor functional capacity and physical performance, use of AM PAC & JH-HLM   - Monitor  gait, balance and fatigue with ambulation    2025 1227 by Monserrat Barrios RN  Outcome: Progressing  2025 by Monserrat Barrios RN  Outcome: Progressing  Goal: Maintains/Returns to pre admission functional level  Description: INTERVENTIONS:  - Perform AM-PAC 6 Click Basic Mobility/ Daily Activity assessment daily.  - Set and communicate daily mobility goal to care team and patient/family/caregiver.   - Collaborate with rehabilitation services on mobility goals if consulted  - Perform Range of Motion  times a day.  - Reposition patient every  hours.  - Dangle patient  times a day  - Stand patient  times a day  - Ambulate patient  times a day  - Out of bed to chair  times a day   - Out of bed for meals times a day  - Out of bed for toileting  - Record patient progress and toleration of activity level   2025 1227 by Monserrat Barrios RN  Outcome: Progressing  2025 by Monserrat Barrios RN  Outcome: Progressing     Problem: DISCHARGE PLANNING  Goal: Discharge to home or other facility with appropriate resources  Description: INTERVENTIONS:  - Identify barriers to discharge w/patient and caregiver  - Arrange for needed discharge resources and transportation as appropriate  - Identify discharge learning needs (meds, wound care, etc.)  - Arrange for interpretive services to assist at discharge as needed  - Refer to Case Management Department for coordinating discharge planning if the patient needs post-hospital services based on physician/advanced practitioner order or complex needs related to functional status, cognitive ability, or social support system  2025 1227 by Monserrat Barrios RN  Outcome: Progressing  2025 by Monserrat Barrios RN  Outcome: Progressing     Problem: POSTPARTUM  Goal: Experiences normal postpartum course  Description: INTERVENTIONS:  - Monitor maternal vital signs  - Assess uterine involution and lochia  Outcome: Progressing  Goal: Appropriate maternal -   bonding  Description: INTERVENTIONS:  - Identify family support  - Assess for appropriate maternal/infant bonding   -Encourage maternal/infant bonding opportunities  - Referral to  or  as needed  Outcome: Progressing  Goal: Establishment of infant feeding pattern  Description: INTERVENTIONS:  - Assess breast/bottle feeding  - Refer to lactation as needed  Outcome: Progressing  Goal: Incision(s), wounds(s) or drain site(s) healing without S/S of infection  Description: INTERVENTIONS  - Assess and document dressing, incision, wound bed, drain sites and surrounding tissue  - Provide patient and family education  - Perform skin care/dressing changes every   Outcome: Progressing

## 2025-05-27 NOTE — ANESTHESIA PROCEDURE NOTES
Spinal Block    Patient location during procedure: OR  Start time: 5/27/2025 10:26 AM  Reason for block: at surgeon's request and primary anesthetic  Staffing  Performed by: Pardeep Alfredo CRNA  Authorized by: Amanda Evans MD    Preanesthetic Checklist  Completed: patient identified, IV checked, site marked, risks and benefits discussed, surgical consent, monitors and equipment checked, pre-op evaluation and timeout performed  Spinal Block  Patient position: sitting  Prep: ChloraPrep  Patient monitoring: heart rate, cardiac monitor, continuous pulse ox and frequent blood pressure checks  Approach: midline  Location: L3-4  Needle  Needle type: Pencan   Needle gauge: 24 G  Needle length: 4 in  Assessment  Sensory level: T4  Injection Assessment:  negative aspiration for heme, no paresthesia on injection and positive aspiration for clear CSF.  Post-procedure:  site cleaned

## 2025-05-27 NOTE — INTERVAL H&P NOTE
H&P reviewed. After examining the patient I find no changes in the patients condition since the H&P was written. Will proceed with delivery as scheduled.     We reviewed patient's desire for permanent sterilization. She reaffirms that she has completed childbearing and desires to proceed with sterilization. We discussed bilateral tubal ligation versus bilateral salpingectomy. Patient desires to proceed with bilateral salpingectomy. Surgical consent signed.     Prior to surgery, risks of surgery were reviewed. We specifically reviewed the risk of bleeding, infection, damage to surrounding organs/structures (specifically bowel, bladder, vessels, nerves, ureters), difficulty healing, scar tissue formation, hernia, need for blood transfusion, and need for further surgery. We also reviewed implications for increased morbidity in subsequent pregnancy such as accreta or uterine rupture.     Vitals:    05/27/25 0833   BP: 131/91   BP Location: Right arm   Pulse: 83   Resp: 18   Temp: 98.2 °F (36.8 °C)   TempSrc: Oral       Physical Exam:  /91 (BP Location: Right arm)   Pulse 83   Temp 98.2 °F (36.8 °C) (Oral)   Resp 18     Gen: alert, no acute distress  Cardiac: regular rate  Resp: unlabored breathing  Abdomen: gravid, non-tender, non-distended  Extremities: non-tender, no edema    Estimated Fetal Weight: 7.5 lbs  Presentation: cephalic, confirmed via u/s    FHT:  Baseline Rate (FHR): 135 bpm  Variability: Moderate  Accelerations: 15 x 15 or greater  Decelerations: None  TOCO:   Contraction Frequency (minutes): n/a    Membranes: Intact      Chacorta Lewis MD  5/27/2025 9:59 AM

## 2025-05-27 NOTE — OP NOTE
OPERATIVE REPORT  PATIENT NAME: Azra Mahoney    :  1990  MRN: [id number]  Pt Location:  L&D OR ROOM 02    SURGERY DATE: 2025    Surgeons and Role:     * Chacorta Lewis MD - Primary     * Aurora Early MD - Assisting    Pre-op Diagnosis:   Perdue pregnancy at 39w3d in vertex presentation  History of prior  delivery x 1  Request for permanent sterilization    Post-op Diagnosis: Same, delivered    Procedure: Repeat low-transverse  section via Pfannenstiel skin incision with bilateral salpingectomy    Specimen(s):  ID Type Source Tests Collected by Time Destination   1 :  Tissue (Placenta on Hold) OB Only Placenta TISSUE EXAM OB (PLACENTA) ONLY Chacorta Lewis MD 2025    2 : PRN Tissue Fallopian Tube, Left TISSUE EXAM Chacorta Lewis MD 2025    3 : PRN Tissue Fallopian Tube, Right TISSUE EXAM Chacorta Lewis MD 2025    A :  Cord Blood Cord BLOOD GAS, VENOUS, CORD, BLOOD GAS, ARTERIAL, CORD Chacorta Lewis MD 2025        Quantitative blood loss: 287 mL    Drains:  Urethral Catheter Double-lumen;Non-latex 16 Fr. (Active)   Number of days: 0     Anesthesia Type: Spinal    Operative Indications:   Azra Mahoney is a 35 y.o.  at 39w3d for repeat  section in the setting of prior  delivery x 1 and bilateral salpingectomy for elective permanent sterilization. The patient declined trial of labor after prior . All risks, benefits, and alternatives were discussed with the patient. All questions were answered. The patient agreed to proceed with surgery.    [facility] Classification System:  No Multiple pregnancy, No Transverse or oblique lie, No Breech lie, Gestational age is > or =37 weeks, Multiparous, Previous uterine scar +  is J[facility] GROUP 5    Operative Findings:  1. Live infant delivered from vertex position through clear fluid at 10:50, weight 3884 grams, Apgar scores of  8 and 9 at 1 and 5 minutes,  respectively. Nuchal cord x 1.  2. Intact placenta delivered via fundal massage, 3-vessel cord noted.  3. Moderate cicatrix involving subcutaneous tissue, fascia, and rectus muscles. Thin omental adhesions to anterior abdominal wall. Normal appearing bilateral fallopian tubes and ovaries.  4. Approximately 3 cm inferior extension of the hysterotomy just left of midline.     Complications: None    Procedure and Technique:    The patient was taken to the operating room, where she was placed under spinal anesthesia. The patient was then placed in supine position with a leftward tilt. She was prepped and draped in the normal sterile fashion. When anesthesia was found to be adequate, a Pfannenstiel skin incision was made with a scalpel and carried down to the underlying layer of fascia. The fascia was then incised with a scalpel and extended laterally and superiorly with curved Nieto scissors. The superior portion of the fascial incision was then grasped with two Kocher clamps, elevated, and  from the underlying rectus muscles with sharp and blunt dissection. Attention was then turned to the inferior aspect of the fascial incision, which in a similar manner, was grasped with two Kocher clamps, elevated, and  from the underlying rectus muscles using sharp and blunt dissection. The rectus muscles were then  in the midline, and the peritoneum was entered bluntly. The peritoneal incision was then extended superiorly, inferiorly, and laterally using blunt dissection and electrocautery with excellent visualization of the bladder. When adequate exposure had been achieved, the bladder blade was then placed.      A low transverse uterine incision was then made with a scalpel. The uterus was entered bluntly and the hysterotomy was extended bluntly in a cephalad-caudad manner. Amniotomy was performed. The infant was then delivered in vertex presentation. After delayed cord clamping for 30 seconds, the cord  was clamped and cut, and the infant was handed off to awaiting Neonatologist. Cord blood was collected and the placenta was delivered with fundal massage noted to be intact with 3-vessel cord.     The uterus was then exteriorized and cleared of all clots and debris. The inferior extension of the hysterotomy was then closed with 0-Vicryl suture in a running, locked fashion. The hysterotomy was then closed with 0-Vicryl suture in a running, locked fashion. A single figure-of-eight stitch of 0-Vicryl suture was placed at the site of slow bleeding along the inferior extension repair with good response. Excellent hemostasis was noted.     Attention was then turned to the left adnexa, where the fallopian tube was elevated with two Compa clamps. The mesosalpinx was then isolated, clamped, and divided along the full length of the fallopian tube using the LigaSure device. The fallopian tube was transected at the cornua, removed, and sent to pathology. In identical fashion, the right fallopian tube was elevated, mesosalpinx isolated, clamped, divided, and fallopian tube transected, removed, and sent to pathology. Pedicles were closely inspected and noted to be hemostatic bilaterally.     The uterus was then returned to the abdomen, and the gutters were cleared of all clots and debris. The hysterotomy was inspected and noted to be hemostatic. The fascia was then elevated using Kocher clamps and underlying rectus muscles inspected and noted to be hemostatic. The fascia was then closed with 0 Vicryl suture in a running fashion. The deep subcutaneous tissue was then irrigated and closed with 2-0 plain gut suture in two layers, each in a a running fashion. The skin was then closed with 4-0 Monocryl suture in subcuticular fashion.     The patient tolerated the procedure well. Sponge, lap, and needle counts were correct x 3. The patient was taken to the Recovery Room in stable condition.    I was present for the entire procedure.  A qualified resident physician was not available. A co-surgeon was required because of skills and techniques relevant to speciality.    Patient Disposition:  PACU     SIGNATURE: Chacorta Lewis MD  DATE: May 27, 2025  TIME: 12:04 PM

## 2025-05-27 NOTE — ANESTHESIA PREPROCEDURE EVALUATION
Procedure:   SECTION () REPEAT (Uterus)  SALPINGECTOMY (Abdomen)    Relevant Problems   ENDO   (+) Hypothyroidism affecting pregnancy in third trimester      GYN   (+) Multigravida of advanced maternal age in third trimester        () emergency section at 33 weeks due to severe preeclampsia and HELLP syndrome. spinal    Physical Exam    Airway     Mallampati score: III  TM Distance: >3 FB  Neck ROM: full  Mouth opening: >= 4 cm      Cardiovascular      Dental   Comment: None loose, No notable dental hx     Pulmonary      Neurological    She appears awake, alert and oriented x3.      Other Findings  post-pubertal.         Latest Reference Range & Units 25 08:42   WBC 4.31 - 10.16 Thousand/uL 6.27   RBC 3.81 - 5.12 Million/uL 4.18   Hemoglobin 11.5 - 15.4 g/dL 11.4 (L)   Hematocrit 34.8 - 46.1 % 35.5   MCV 82 - 98 fL 85   MCH 26.8 - 34.3 pg 27.3   MCHC 31.4 - 37.4 g/dL 32.1   RDW 11.6 - 15.1 % 13.0   Platelet Count 149 - 390 Thousands/uL 163   MPV 8.9 - 12.7 fL 13.0 (H)   (L): Data is abnormally low  (H): Data is abnormally high        Anesthesia Plan  ASA Score- 2     Anesthesia Type- spinal with ASA Monitors.         Additional Monitors:     Airway Plan: natural airway.    Comment: NPO after MN    Active blood type and screen: A+ no antibodies.       Plan Factors-Exercise tolerance (METS): >4 METS.    Chart reviewed.   Existing labs reviewed. Patient summary reviewed.    Patient is not a current smoker.              Induction-     Postoperative Plan- Plan for postoperative opioid use.   Monitoring Plan - Monitoring plan - standard ASA monitoring  Post Operative Pain Plan - plan for postoperative opioid use and multimodal analgesia    Perioperative Resuscitation Plan - Level 1 - Full Code.       Informed Consent- Anesthetic plan and risks discussed with patient.  I personally reviewed this patient with the CRNA. Discussed and agreed on the Anesthesia Plan with the CRNA..      NPO  Status:  Vitals Value Taken Time   Date of last liquid 05/26/25 05/27/25 08:57   Time of last liquid 1900 05/27/25 08:57   Date of last solid 05/26/25 05/27/25 08:57   Time of last solid 1900 05/27/25 08:57

## 2025-05-27 NOTE — ANESTHESIA POSTPROCEDURE EVALUATION
Post-Op Assessment Note    CV Status:  Stable  Pain Score: 0    Pain management: adequate    Multimodal analgesia used between 6 hours prior to anesthesia start to PACU discharge    Mental Status:  Alert and awake   Hydration Status:  Euvolemic and stable   PONV Controlled:  Controlled   Airway Patency:  Patent  Two or more mitigation strategies used for obstructive sleep apnea   Post Op Vitals Reviewed: Yes    No anethesia notable event occurred.    Staff: CRNA           Last Filed PACU Vitals:  Vitals Value Taken Time   Temp 97.6    Pulse 81    /53    Resp 12    SpO2 98

## 2025-05-27 NOTE — LACTATION NOTE
This note was copied from a baby's chart.  CONSULT - LACTATION  Baby Boy (Mary Mahoney 0 days male MRN: [id number]    Critical access hospital NURSERY Room / Bed: (N)/(N) Encounter: [id number]    Maternal Information     MOTHER:  Azra Mahoney  Maternal Age: 35 y.o.  OB History: # 1 - Date: 22, Sex: Male, Weight: 1673 g (3 lb 11 oz), GA: 33w0d, Type: , Low Transverse, Apgar1: None, Apgar5: None, Living: Living, Birth Comments: Severe Preeclampsia, HELLP Syndrome, Severe IUGR    # 2 - Date: 2023, Sex: None, Weight: None, GA: None, Type: None, Apgar1: None, Apgar5: None, Living: None, Birth Comments: None    # 3 - Date: 25, Sex: Male, Weight: 3884 g (8 lb 9 oz), GA: 39w3d, Type: , Low Transverse, Apgar1: 8, Apgar5: 9, Living: Living, Birth Comments: None   Previous breast reduction surgery? No    Lactation history:   Has patient previously breast fed: Yes   How long had patient previously breast fed: 6 weeks   Previous breast feeding complications: Other (Comment), Infant separation, Low milk supply (33 week BOY in NICU)     Past Surgical History:   Procedure Laterality Date     SECTION      LAPAROSCOPY  2020    Confirmed PCOS and underwent Ovarian drilling perfomed,    MOUTH SURGERY Bilateral     Removal of 7 baby teeth at age 12    WISDOM TOOTH EXTRACTION Bilateral        Birth information:  YOB: 2027   Time of birth: 10:50 AM   Sex: male   Delivery type: , Low Transverse   Birth Weight: 3884 g (8 lb 9 oz)   Percent of Weight Change: 0%     Gestational Age: 39w3d   [unfilled]    Reason for Consult:    Reason for Consult: Initial assessment (routine) - 10 min, High Risk Infant - 10 min, Discharge (routine) - 10 min    Risk Factors:    Risk Factors:  (baby on blood sugar protocol)    Breast and nipple assessment:   Breasts/Nipples  Intervention: Other (comment) (Overview of establishing breastfeeding &  support available)  Breastfeeding Progress: Not yet established  Reasons for not Breastfeeding:  (Mom concerned with medical obsticles)    OB Lactation Tools:         Breast Pump:    Breast Pump  Pump: Personal (has [employer] & [employer])  Pump Review/Education: Milk storage       Assessment: sleepy; held by Family    Feeding assessment: feeding well after delivery as per Mom & staff    LATCH:  Latch: Repeated attempts, hold nipple in mouth, stimulate to suck   Audible Swallowing: A few with stimulation   Type of Nipple: Everted (After stimulation)   Comfort (Breast/Nipple): Soft/non-tender   Hold (Positioning): No assist from staff, mother able to position/hold infant   LATCH Score: 8       YomielTravker:                   Feeding recommendations:  breast feed on demand       25 1330   Patient Follow-Up   Lactation Consult Status 2   Follow-Up Type Inpatient;Call as needed   Other OB Lactation Documentation    Additional Problem Noted Initial Visit - Family with some experience; visitors @ bedside; overview of breastfeeding  (Ready, Set Baby & Discharge Booklets @ bedside)       Overview Ready, Set Baby &  discharge breastfeeding booklets  including the feeding log. Emphasized 8 or more (12) feedings in a 24 hour period, what to expect for the number of diapers per day of life and the progression of properties of the  stooling pattern.    List of reasons to call a lactation consultant.  Feeding logs  Feeding cues  Hand expression  Baby's Second day (cluster feeding)  Breastfeeding and Your Lifestyle (Medications, Alcohol, Caffeine, Smoking, Street Drugs, Methadone)  First Two Weeks Survival Guide for Breastfeeding  Breast Changes  Physical Therapy  Storage and Handling of Breast milk  How to Keep Your Breast Pump Kit Clean  The Employed Breastfeeding Mother  Mixed feeding  Bottle feeding like breastfeeding (paced bottle feeding)  astfeeding and your lifestyle, storage and preparation of breast milk, how  to keep you breast pump clean, the employed breastfeeding mother and paced bottle feeding handouts.     Booklet included Breastfeeding Resources for after discharge including access to the number for the [facility].    Encouraged parents to call for assistance, questions, and concerns about breastfeeding.     Dedra Nguyen RN 5/27/2025 2:29 PM

## 2025-05-27 NOTE — PLAN OF CARE
Problem: PAIN - ADULT  Goal: Verbalizes/displays adequate comfort level or baseline comfort level  Description: Interventions:  - Encourage patient to monitor pain and request assistance  - Assess pain using appropriate pain scale  - Administer analgesics as ordered based on type and severity of pain and evaluate response  - Implement non-pharmacological measures as appropriate and evaluate response  - Consider cultural and social influences on pain and pain management  - Notify physician/advanced practitioner if interventions unsuccessful or patient reports new pain  - Educate patient/family on pain management process including their role and importance of  reporting pain   - Provide non-pharmacologic/complimentary pain relief interventions  Outcome: Progressing     Problem: INFECTION - ADULT  Goal: Absence or prevention of progression during hospitalization  Description: INTERVENTIONS:  - Assess and monitor for signs and symptoms of infection  - Monitor lab/diagnostic results  - Monitor all insertion sites, i.e. indwelling lines, tubes, and drains  - Monitor endotracheal if appropriate and nasal secretions for changes in amount and color  - [place] appropriate cooling/warming therapies per order  - Administer medications as ordered  - Instruct and encourage patient and family to use good hand hygiene technique  - Identify and instruct in appropriate isolation precautions for identified infection/condition  Outcome: Progressing  Goal: Absence of fever/infection during neutropenic period  Description: INTERVENTIONS:  - Monitor WBC  - Perform strict hand hygiene  - Limit to healthy visitors only  - No plants, dried, fresh or silk flowers with philip in patient room  Outcome: Progressing     Problem: SAFETY ADULT  Goal: Patient will remain free of falls  Description: INTERVENTIONS:  - Educate patient/family on patient safety including physical limitations  - Instruct patient to call for assistance with activity   -  Consider consulting OT/PT to assist with strengthening/mobility based on AM PAC & JH-HLM score  - Consult OT/PT to assist with strengthening/mobility   - Keep Call bell within reach  - Keep bed low and locked with side rails adjusted as appropriate  - Keep care items and personal belongings within reach  - Initiate and maintain comfort rounds  - Make Fall Risk Sign visible to staff  - Offer Toileting every  Hours, in advance of need  - Initiate/Maintain alarm  - Obtain necessary fall risk management equipment:   - Apply yellow socks and bracelet for high fall risk patients  - Consider moving patient to room near nurses station  Outcome: Progressing  Goal: Maintain or return to baseline ADL function  Description: INTERVENTIONS:  -  Assess patient's ability to carry out ADLs; assess patient's baseline for ADL function and identify physical deficits which impact ability to perform ADLs (bathing, care of mouth/teeth, toileting, grooming, dressing, etc.)  - Assess/evaluate cause of self-care deficits   - Assess range of motion  - Assess patient's mobility; develop plan if impaired  - Assess patient's need for assistive devices and provide as appropriate  - Encourage maximum independence but intervene and supervise when necessary  - Involve family in performance of ADLs  - Assess for home care needs following discharge   - Consider OT consult to assist with ADL evaluation and planning for discharge  - Provide patient education as appropriate  - Monitor functional capacity and physical performance, use of AM PAC & JH-HLM   - Monitor gait, balance and fatigue with ambulation    Outcome: Progressing  Goal: Maintains/Returns to pre admission functional level  Description: INTERVENTIONS:  - Perform AM-PAC 6 Click Basic Mobility/ Daily Activity assessment daily.  - Set and communicate daily mobility goal to care team and patient/family/caregiver.   - Collaborate with rehabilitation services on mobility goals if consulted  -  Perform Range of Motion  times a day.  - Reposition patient every  hours.  - Dangle patient  times a day  - Stand patient  times a day  - Ambulate patient  times a day  - Out of bed to chair  times a day   - Out of bed for meals times a day  - Out of bed for toileting  - Record patient progress and toleration of activity level   Outcome: Progressing     Problem: DISCHARGE PLANNING  Goal: Discharge to home or other facility with appropriate resources  Description: INTERVENTIONS:  - Identify barriers to discharge w/patient and caregiver  - Arrange for needed discharge resources and transportation as appropriate  - Identify discharge learning needs (meds, wound care, etc.)  - Arrange for interpretive services to assist at discharge as needed  - Refer to Case Management Department for coordinating discharge planning if the patient needs post-hospital services based on physician/advanced practitioner order or complex needs related to functional status, cognitive ability, or social support system  Outcome: Progressing     Problem: Knowledge Deficit  Goal: Patient/family/caregiver demonstrates understanding of disease process, treatment plan, medications, and discharge instructions  Description: Complete learning assessment and assess knowledge base.  Interventions:  - Provide teaching at level of understanding  - Provide teaching via preferred learning methods  Outcome: Progressing  Goal: Verbalizes understanding of labor plan  Description: Assess patient/family/caregiver's baseline knowledge level and ability to understand information.  Provide education via patient/family/caregiver's preferred learning method at appropriate level of understanding.     1. Provide teaching at level of understanding.  2. Provide teaching via preferred learning method(s).  Outcome: Progressing     Problem: Labor & Delivery  Goal: Manages discomfort  Description: Assess and monitor for signs and symptoms of discomfort.  Assess patient's pain  level regularly and per hospital policy.  Administer medications as ordered. Support use of nonpharmacological methods to help control pain such as distraction, imagery, relaxation, and application of heat and cold.  Collaborate with interdisciplinary team and patient to determine appropriate pain management plan.    1. Include patient in decisions related to comfort.  2. Offer non-pharmacological pain management interventions.  3. Report ineffective pain management to physician.  Outcome: Progressing  Goal: Patient vital signs are stable  Description: 1. Assess vital signs - vaginal delivery.  Outcome: Progressing     Problem: BIRTH - VAGINAL/ SECTION  Goal: Fetal and maternal status remain reassuring during the birth process  Description: INTERVENTIONS:  - Monitor vital signs  - Monitor fetal heart rate  - Monitor uterine activity  - Monitor labor progression (vaginal delivery)  - DVT prophylaxis  - Antibiotic prophylaxis  Outcome: Progressing  Goal: Emotionally satisfying birthing experience for mother/fetus  Description: Interventions:  - Assess, plan, implement and evaluate the nursing care given to the patient in labor  - Advocate the philosophy that each childbirth experience is a unique experience and support the family's chosen level of involvement and control during the labor process   - Actively participate in both the patient's and family's teaching of the birth process  - Consider cultural, Gnosticist and age-specific factors and plan care for the patient in labor  Outcome: Progressing

## 2025-05-28 PROBLEM — Z98.891 S/P REPEAT LOW TRANSVERSE C-SECTION: Status: ACTIVE | Noted: 2025-05-28

## 2025-05-28 LAB
ALBUMIN SERPL BCG-MCNC: 2.5 G/DL (ref 3.5–5)
ALP SERPL-CCNC: 87 U/L (ref 34–104)
ALT SERPL W P-5'-P-CCNC: 10 U/L (ref 7–52)
ANION GAP SERPL CALCULATED.3IONS-SCNC: 4 MMOL/L (ref 4–13)
AST SERPL W P-5'-P-CCNC: 15 U/L (ref 13–39)
BASOPHILS # BLD AUTO: 0.01 THOUSANDS/ÂΜL (ref 0–0.1)
BASOPHILS NFR BLD AUTO: 0 % (ref 0–1)
BILIRUB SERPL-MCNC: 0.19 MG/DL (ref 0.2–1)
BUN SERPL-MCNC: 16 MG/DL (ref 5–25)
CALCIUM ALBUM COR SERPL-MCNC: 9.1 MG/DL (ref 8.3–10.1)
CALCIUM SERPL-MCNC: 7.9 MG/DL (ref 8.4–10.2)
CHLORIDE SERPL-SCNC: 110 MMOL/L (ref 96–108)
CO2 SERPL-SCNC: 22 MMOL/L (ref 21–32)
CREAT SERPL-MCNC: 0.99 MG/DL (ref 0.6–1.3)
EOSINOPHIL # BLD AUTO: 0.04 THOUSAND/ÂΜL (ref 0–0.61)
EOSINOPHIL NFR BLD AUTO: 1 % (ref 0–6)
ERYTHROCYTE [DISTWIDTH] IN BLOOD BY AUTOMATED COUNT: 13.3 % (ref 11.6–15.1)
GFR SERPL CREATININE-BSD FRML MDRD: 73 ML/MIN/1.73SQ M
GLUCOSE SERPL-MCNC: 75 MG/DL (ref 65–140)
HCT VFR BLD AUTO: 28.6 % (ref 34.8–46.1)
HGB BLD-MCNC: 9.2 G/DL (ref 11.5–15.4)
IMM GRANULOCYTES # BLD AUTO: 0.05 THOUSAND/UL (ref 0–0.2)
IMM GRANULOCYTES NFR BLD AUTO: 1 % (ref 0–2)
LYMPHOCYTES # BLD AUTO: 1.83 THOUSANDS/ÂΜL (ref 0.6–4.47)
LYMPHOCYTES NFR BLD AUTO: 25 % (ref 14–44)
MCH RBC QN AUTO: 27.8 PG (ref 26.8–34.3)
MCHC RBC AUTO-ENTMCNC: 32.2 G/DL (ref 31.4–37.4)
MCV RBC AUTO: 86 FL (ref 82–98)
MONOCYTES # BLD AUTO: 0.54 THOUSAND/ÂΜL (ref 0.17–1.22)
MONOCYTES NFR BLD AUTO: 7 % (ref 4–12)
NEUTROPHILS # BLD AUTO: 4.86 THOUSANDS/ÂΜL (ref 1.85–7.62)
NEUTS SEG NFR BLD AUTO: 66 % (ref 43–75)
NRBC BLD AUTO-RTO: 0 /100 WBCS
PLATELET # BLD AUTO: 126 THOUSANDS/UL (ref 149–390)
PMV BLD AUTO: 12.4 FL (ref 8.9–12.7)
POTASSIUM SERPL-SCNC: 4.5 MMOL/L (ref 3.5–5.3)
PROT SERPL-MCNC: 4.6 G/DL (ref 6.4–8.4)
RBC # BLD AUTO: 3.31 MILLION/UL (ref 3.81–5.12)
SODIUM SERPL-SCNC: 136 MMOL/L (ref 135–147)
TREPONEMA PALLIDUM IGG+IGM AB [PRESENCE] IN SERUM OR PLASMA BY IMMUNOASSAY: NORMAL
WBC # BLD AUTO: 7.33 THOUSAND/UL (ref 4.31–10.16)

## 2025-05-28 PROCEDURE — 85025 COMPLETE CBC W/AUTO DIFF WBC: CPT | Performed by: STUDENT IN AN ORGANIZED HEALTH CARE EDUCATION/TRAINING PROGRAM

## 2025-05-28 PROCEDURE — 80053 COMPREHEN METABOLIC PANEL: CPT | Performed by: OBSTETRICS & GYNECOLOGY

## 2025-05-28 PROCEDURE — 94760 N-INVAS EAR/PLS OXIMETRY 1: CPT

## 2025-05-28 PROCEDURE — 99024 POSTOP FOLLOW-UP VISIT: CPT | Performed by: OBSTETRICS & GYNECOLOGY

## 2025-05-28 RX ORDER — ACETAMINOPHEN 325 MG/1
650 TABLET ORAL EVERY 6 HOURS SCHEDULED
Status: DISCONTINUED | OUTPATIENT
Start: 2025-05-28 | End: 2025-05-29 | Stop reason: HOSPADM

## 2025-05-28 RX ORDER — ACETAMINOPHEN 325 MG/1
650 TABLET ORAL EVERY 6 HOURS SCHEDULED
Status: DISCONTINUED | OUTPATIENT
Start: 2025-05-28 | End: 2025-05-28

## 2025-05-28 RX ADMIN — IBUPROFEN 600 MG: 600 TABLET ORAL at 17:54

## 2025-05-28 RX ADMIN — SODIUM CHLORIDE, SODIUM LACTATE, POTASSIUM CHLORIDE, AND CALCIUM CHLORIDE 125 ML/HR: .6; .31; .03; .02 INJECTION, SOLUTION INTRAVENOUS at 12:25

## 2025-05-28 RX ADMIN — ENOXAPARIN SODIUM 40 MG: 40 INJECTION SUBCUTANEOUS at 08:19

## 2025-05-28 RX ADMIN — KETOROLAC TROMETHAMINE 30 MG: 30 INJECTION, SOLUTION INTRAMUSCULAR; INTRAVENOUS at 06:08

## 2025-05-28 RX ADMIN — IBUPROFEN 600 MG: 600 TABLET ORAL at 23:16

## 2025-05-28 RX ADMIN — ACETAMINOPHEN 650 MG: 325 TABLET, FILM COATED ORAL at 19:47

## 2025-05-28 RX ADMIN — IBUPROFEN 600 MG: 600 TABLET ORAL at 12:26

## 2025-05-28 RX ADMIN — ACETAMINOPHEN 650 MG: 325 TABLET, FILM COATED ORAL at 08:15

## 2025-05-28 RX ADMIN — DOCUSATE SODIUM 100 MG: 100 CAPSULE, LIQUID FILLED ORAL at 17:56

## 2025-05-28 RX ADMIN — ACETAMINOPHEN 650 MG: 325 TABLET, FILM COATED ORAL at 14:06

## 2025-05-28 RX ADMIN — DOCUSATE SODIUM 100 MG: 100 CAPSULE, LIQUID FILLED ORAL at 08:18

## 2025-05-28 RX ADMIN — ACETAMINOPHEN 650 MG: 325 TABLET, FILM COATED ORAL at 03:20

## 2025-05-28 RX ADMIN — LEVOTHYROXINE SODIUM 125 MCG: 25 TABLET ORAL at 06:08

## 2025-05-28 RX ADMIN — IRON SUCROSE 200 MG: 20 INJECTION, SOLUTION INTRAVENOUS at 14:09

## 2025-05-28 NOTE — ASSESSMENT & PLAN NOTE
- POD#1 s/p repeat LTCS and bilateral salpingectomy  - pain controlled  - UOP overnight low, but responded in increase in IVF and this morning 100cc/hour over a few hours, catheter removed and awaiting void. Will continue IVF until void.  - Lovenox qd for DVT prophylaxis  - discharge in 1-2 days

## 2025-05-28 NOTE — ASSESSMENT & PLAN NOTE
- Admission hgb 11.4g/dl - PPD#1 9.2g/dl  - mild lightheaded this morning but after she had been up and ambulating for awhile, resolved with rest  - if symptoms recur will repeat CBC  - discussed IV Venofer while here followed by PO iron on discharge, vs PO iron once BM and postpartum  - she agrees to IV Venofer for up to 3 doses while admitted and then PO iron on discharge once having Bms

## 2025-05-28 NOTE — PROGRESS NOTES
Progress Note - OB/GYN   Name: Azra Mahoney 35 y.o. female I MRN: [id number]  Unit/Bed#: - I Date of Admission: 2025   Date of Service: 2025 I Hospital Day: 1    Assessment & Plan  S/P repeat low transverse   - POD#1 s/p repeat LTCS and bilateral salpingectomy  - pain controlled  - UOP overnight low, but responded in increase in IVF and this morning 100cc/hour over a few hours, catheter removed and awaiting void. Will continue IVF until void.  - Lovenox qd for DVT prophylaxis  - discharge in 1-2 days    Anemia, postpartum  - Admission hgb 11.4g/dl - PPD#1 9.2g/dl  - mild lightheaded this morning but after she had been up and ambulating for awhile, resolved with rest  - if symptoms recur will repeat CBC  - discussed IV Venofer while here followed by PO iron on discharge, vs PO iron once BM and postpartum  - she agrees to IV Venofer for up to 3 doses while admitted and then PO iron on discharge once having Bms    Hypothyroidism affecting pregnancy in third trimester  - Continue home levothyroxine    Request for sterilization  - s/p bilateral salpingectomy      OB Post-Partum Progress Note  Subjective   Post delivery. Patient is doing well. Lochia WNL. Pain well controlled.     Pain: yes, cramping, improved with meds  Tolerating PO: yes  Voiding: catheter recently removed  Flatus: no  BM: no  Ambulating: yes  Breastfeeding:  breast  Chest pain: no  Shortness of breath: no  Leg pain: no  Lochia: WNL    Objective :  Temp:  [97.5 °F (36.4 °C)-98.2 °F (36.8 °C)] 97.5 °F (36.4 °C)  HR:  [65-95] 95  BP: (104-129)/(57-81) 124/59  Resp:  [18] 18  SpO2:  [98 %-100 %] 100 %  O2 Device: None (Room air)    Physical Exam  General: in no apparent distress  Abdomen: abdomen is soft without significant tenderness  Fundus: Firm and non-tender, 1 below the umbilicus  Incision: C/D/I, steristrips in place  Lower extremities: nontender      Lab Results: I have reviewed the following results:  Lab Results    Component Value Date    WBC 7.33 05/28/2025    HGB 9.2 (L) 05/28/2025    HCT 28.6 (L) 05/28/2025    MCV 86 05/28/2025     (L) 05/28/2025

## 2025-05-28 NOTE — ANESTHESIA POSTPROCEDURE EVALUATION
Post-Op Assessment Note    CV Status:  Stable  Pain Score: 0    Pain management: adequate       Mental Status:  Alert and awake   Hydration Status:  Stable   PONV Controlled:  None   Airway Patency:  Patent and adequate     Post Op Vitals Reviewed: Yes    No anethesia notable event occurred.    Staff: Anesthesiologist           Last Filed PACU Vitals:  Vitals Value Taken Time   Temp 97.5 °F (36.4 °C) 05/27/25 12:45   Pulse 74 05/27/25 12:45   /59 05/27/25 12:45   Resp 18 05/27/25 12:45   SpO2 100 % 05/27/25 12:45       Modified Carol:     Vitals Value Taken Time   Activity 2 05/27/25 12:45   Respiration 2 05/27/25 12:45   Circulation 2 05/27/25 12:45   Consciousness 2 05/27/25 12:45   Oxygen Saturation 2 05/27/25 12:45     Modified Carol Score: 10

## 2025-05-28 NOTE — LACTATION NOTE
This note was copied from a baby's chart.  CONSULT - LACTATION  Baby Boy (Mary Mahoney 1 days male MRN: [id number]    [facility] NURSERY Room / Bed: (N)/(N) Encounter: [id number]    Maternal Information     MOTHER:  Azra Mahoney  Maternal Age: 35 y.o.  OB History: # 1 - Date: 22, Sex: Male, Weight: 1673 g (3 lb 11 oz), GA: 33w0d, Type: , Low Transverse, Apgar1: None, Apgar5: None, Living: Living, Birth Comments: Severe Preeclampsia, HELLP Syndrome, Severe IUGR    # 2 - Date: 2023, Sex: None, Weight: None, GA: None, Type: None, Apgar1: None, Apgar5: None, Living: None, Birth Comments: None    # 3 - Date: 25, Sex: Male, Weight: 3884 g (8 lb 9 oz), GA: 39w3d, Type: , Low Transverse, Apgar1: 8, Apgar5: 9, Living: Living, Birth Comments: None   Previous breast reduction surgery? No    Lactation history:   Has patient previously breast fed: Yes   How long had patient previously breast fed: 6 weeks   Previous breast feeding complications: Low milk supply, Infant separation (NICU admission, . Pumped q 2 hrs, getting <1oz.)     Past Surgical History:   Procedure Laterality Date     SECTION      LAPAROSCOPY  2020    Confirmed PCOS and underwent Ovarian drilling perfomed,    MOUTH SURGERY Bilateral     Removal of 7 baby teeth at age 12    WISDOM TOOTH EXTRACTION Bilateral        Birth information:  YOB: [birth year]25   Time of birth: 10:50 AM   Sex: male   Delivery type: , Low Transverse   Birth Weight: 3884 g (8 lb 9 oz)   Percent of Weight Change: -3%     Gestational Age: 39w3d   [unfilled]    Reason for Consult:    Reason for Consult: Follow-up assessment (routine) -10 min, Latch Assess (ext) - 20 min    Risk Factors:    Risk Factors:  (Hypothyroidism/PCOS)    Breast and nipple assessment:   Breasts/Nipples  Left Breast: Soft  Right Breast: Soft  Left Nipple: Everted  Right Nipple:  Everted  Intervention: Other (comment) (Overview of establishing breastfeeding & support available)  Breastfeeding Progress: Not yet established  Reasons for not Breastfeeding:  (Mom concerned with medical obsticles)    Breast Pump:    Breast Pump  Pump: Personal (Deciding on breast pump through ins. Has pumps from previous pregnancy.)  Pump Review/Education: Milk storage       Assessment: sleepy, post bath    Feeding assessment: feeding well with stimulation  LATCH:  Latch: Grasps breast, tongue down, lips flanged, rhythmic sucking   Audible Swallowing: A few with stimulation   Type of Nipple: Everted (After stimulation)   Comfort (Breast/Nipple): Soft/non-tender   Hold (Positioning): Partial assist, teach one side, mother does other, staff holds   LATCH Score: 8       Feeding recommendations:  breast feed on demand every 2-3 hours, watching for early feeding cues. At least 8-12 feedings in 24 hours.    Followed up with Azra to review breastfeeding progress and assist with feeding.     Azra has a history of hypothyroidism, PCOS and infertility. Patient's previous baby was born  and patient would pump for the baby in the NICU. Patient would pump every 2-3 hours and collect <1oz. Patient continued for 6 weeks.  Patient reports to be taking synthroid but has not got her levels checked since her last pregnancy.  Encouraged patient to follow up with her doctor for updated blood work and to see if any adjustments need to be made.  Due to her medical history and past experience of low milk supply, patient is concerned if her milk supply would be adequate enough for baby.    Discussed monitoring baby's weight closely while in hospital and after discharge to ensure that baby is feeding adequately from the breast. Reviewed to ensure that baby is meeting goal diapers and to ensure that baby's stools transition to yellow/seedy by day 4-5.     Assisted with feeding after bath. Baby was going for circumcision  procedure so family wanted to ensure baby feeds well before then.  Adjustments were made to baby's latch. Patient reported strong pull and tug sensation but denies any pinching at the breast.  Encouraged breast compression and stimulation techniques to keep baby actively sucking at the breast.  Discussed offering two breasts per feed, and to alternate if baby takes the one. Starting off on the breast baby last fed off of when baby takes both breasts.    Reviewed waking techniques with family for when baby gets back from procedure.     Reviewed baby's belly size and cluster feeding. Discussed cluster feeding is a normal baby behavior and helps bring in a good milk supply and helps prevent excessive weight loss.     Discussed that colostrum is thick and sticky and comes in small amounts in the first few days. Reviewed that breast milk will start to transition day 3-5.    Feeding plan:  Patient is encouraged to breastfeed on demand, every 2-3 hours or when baby showing early feeding cues. Reviewed to offer both breasts during a feed.  Discussed the importance of ensuring that baby feeds 8-12x in 24 hours and not waiting over 3 hours to feed. Educated to watch the baby for hunger and fullness cues.     Discussed how to know the baby is feeding adequately at the breast:  -Baby effectively feeding at least 8-12 times in 24 hours.  -Mother feels a comfortable tugging sensation during a feeding.  -Baby is showing fullness cues, post feed.  -Baby is having adequate amounts of diapers and meeting goal diapers.  -Baby's stool is changing from black meconium, to greenish brown to yellow and seedy looking over the first week when exclusively providing breast milk.   -Baby's weight loss is within normal ranges.     Patient was encouraged to continue to document baby's feedings and outputs to ensure baby is adequately feeding at the breast.     Discussed community resources for continued breastfeeding support once discharged home.  Encouraged to contact with [facility] as needed.    Patient was encouraged to contact lactation for a latch assessment, continued support and/or questions that arise.      MUSA Jean  5/28/2025 11:38 AM

## 2025-05-29 VITALS
DIASTOLIC BLOOD PRESSURE: 88 MMHG | HEART RATE: 79 BPM | TEMPERATURE: 97.6 F | SYSTOLIC BLOOD PRESSURE: 131 MMHG | OXYGEN SATURATION: 100 % | RESPIRATION RATE: 17 BRPM

## 2025-05-29 PROBLEM — O99.210 OBESITY AFFECTING PREGNANCY: Status: RESOLVED | Noted: 2024-12-30 | Resolved: 2025-05-29

## 2025-05-29 PROBLEM — O09.299 HISTORY OF HELLP SYNDROME, CURRENTLY PREGNANT: Status: RESOLVED | Noted: 2024-11-25 | Resolved: 2025-05-29

## 2025-05-29 PROBLEM — R03.0 ELEVATED BLOOD PRESSURE READING WITHOUT DIAGNOSIS OF HYPERTENSION: Status: ACTIVE | Noted: 2025-05-29

## 2025-05-29 PROBLEM — O09.523 MULTIGRAVIDA OF ADVANCED MATERNAL AGE IN THIRD TRIMESTER: Status: RESOLVED | Noted: 2024-11-25 | Resolved: 2025-05-29

## 2025-05-29 PROBLEM — O09.299 HISTORY OF INTRAUTERINE GROWTH RESTRICTION IN PRIOR PREGNANCY, CURRENTLY PREGNANT: Status: RESOLVED | Noted: 2024-12-30 | Resolved: 2025-05-29

## 2025-05-29 PROBLEM — O09.813 PREGNANCY RESULTING FROM IN VITRO FERTILIZATION IN THIRD TRIMESTER: Status: RESOLVED | Noted: 2024-11-25 | Resolved: 2025-05-29

## 2025-05-29 PROBLEM — O34.211 MATERNAL CARE DUE TO LOW TRANSVERSE UTERINE SCAR FROM PREVIOUS CESAREAN DELIVERY: Status: RESOLVED | Noted: 2024-12-11 | Resolved: 2025-05-29

## 2025-05-29 LAB
DME PARACHUTE DELIVERY DATE ACTUAL: NORMAL
DME PARACHUTE DELIVERY DATE REQUESTED: NORMAL
DME PARACHUTE ITEM DESCRIPTION: NORMAL
DME PARACHUTE ORDER STATUS: NORMAL
DME PARACHUTE SUPPLIER NAME: NORMAL
DME PARACHUTE SUPPLIER PHONE: NORMAL

## 2025-05-29 PROCEDURE — NC001 PR NO CHARGE: Performed by: OBSTETRICS & GYNECOLOGY

## 2025-05-29 PROCEDURE — 99024 POSTOP FOLLOW-UP VISIT: CPT | Performed by: OBSTETRICS & GYNECOLOGY

## 2025-05-29 RX ORDER — IBUPROFEN 600 MG/1
600 TABLET, FILM COATED ORAL EVERY 6 HOURS
Qty: 30 TABLET | Refills: 0 | Status: SHIPPED | OUTPATIENT
Start: 2025-05-29

## 2025-05-29 RX ORDER — FERROUS SULFATE 324(65)MG
324 TABLET, DELAYED RELEASE (ENTERIC COATED) ORAL
Qty: 60 TABLET | Refills: 0 | Status: SHIPPED | OUTPATIENT
Start: 2025-05-29

## 2025-05-29 RX ORDER — OXYCODONE HYDROCHLORIDE 5 MG/1
5 TABLET ORAL EVERY 4 HOURS PRN
Qty: 6 TABLET | Refills: 0 | Status: SHIPPED | OUTPATIENT
Start: 2025-05-29 | End: 2025-06-08

## 2025-05-29 RX ORDER — DOCUSATE SODIUM 100 MG/1
100 CAPSULE, LIQUID FILLED ORAL 2 TIMES DAILY PRN
Qty: 30 CAPSULE | Refills: 0 | Status: SHIPPED | OUTPATIENT
Start: 2025-05-29

## 2025-05-29 RX ADMIN — LEVOTHYROXINE SODIUM 125 MCG: 25 TABLET ORAL at 06:31

## 2025-05-29 RX ADMIN — DOCUSATE SODIUM 100 MG: 100 CAPSULE, LIQUID FILLED ORAL at 06:34

## 2025-05-29 RX ADMIN — IBUPROFEN 600 MG: 600 TABLET ORAL at 06:31

## 2025-05-29 RX ADMIN — ENOXAPARIN SODIUM 40 MG: 40 INJECTION SUBCUTANEOUS at 08:08

## 2025-05-29 RX ADMIN — ACETAMINOPHEN 650 MG: 325 TABLET, FILM COATED ORAL at 08:07

## 2025-05-29 RX ADMIN — ACETAMINOPHEN 650 MG: 325 TABLET, FILM COATED ORAL at 03:17

## 2025-05-29 RX ADMIN — IRON SUCROSE 200 MG: 20 INJECTION, SOLUTION INTRAVENOUS at 08:08

## 2025-05-29 NOTE — NURSING NOTE
RN reviewed d/c education with pt and FOB. Educated parents on safe sleep,  screenings, bath instructions, shaken baby, car seat safety, POST birth warning signs, and /postpartum care. All questions answered, no additional questions at this time. Educational paperwork given, parents verbalize understanding.

## 2025-05-29 NOTE — PROGRESS NOTES
Progress Note - OB/GYN   Name: Azra Mahoney 35 y.o. female I MRN: [id number]  Unit/Bed#: -01 I Date of Admission: 2025   Date of Service: 2025 I Hospital Day: 2     Assessment & Plan  S/P repeat low transverse   -A/P.  35 y.o.  POD#2 s/p , Low Transverse at 39w3d of 3884 g (8 lb 9 oz) male , apgars 8 /9 .  (1) POD#2.  Delivered 2025 10:50 AM.  --> Routine postoperative care.  Anemia, postpartum  --> Home on oral iron  Hypothyroidism affecting pregnancy in third trimester  --> Continue home levothyroxine  Request for sterilization  S/p bilateral salpingectomy  --> Await final pathology report  Elevated blood pressure reading without diagnosis of hypertension  Initial blood pressure on presentation was 131/91.  All subsequent blood pressures have been normal.    Discussed with her.  Precs discussed.    Does not meet criteria for home BP monitoring.  --> Routine followup.    Andres Russ MD  25  ----------------------------------------------------------------------------------------------    Subjective/    Feels well.  Tolerating po, ambulating, voiding without difficulty.  Happy.  Bonding.  Pain controlled with oral medications.  (+)flatus.    Had headache at some point, that she associated with lack of sleep.  Abated spontaneously.  No headache now.  No visual field changes.    Objective/  Blood pressure 131/88, pulse 79, temperature 97.6 °F (36.4 °C), temperature source Oral, resp. rate 17, SpO2 100%, currently breastfeeding.    Patient Vitals for the past 24 hrs:   BP Temp Temp src Pulse Resp SpO2   25 0738 131/88 97.6 °F (36.4 °C) Oral 79 17 100 %   25 0317 124/73 97.9 °F (36.6 °C) Oral 82 18 99 %   25 2316 123/78 97.7 °F (36.5 °C) Oral 75 18 97 %   25 1900 122/66 98.1 °F (36.7 °C) Oral 89 18 99 %   25 1518 115/57 97.8 °F (36.6 °C) Oral 89 18 97 %   25 1212 124/59 97.5 °F (36.4 °C) Oral 95 18 100 %        Physical Exam/      General:  Alert, comfortable, NAD      Cardiovascular: Regular rate and rhythm      Respiratory: Clear to auscultation bilaterally.      Abdomen: Soft, non-tender, non-distended.  Incision clean, dry, and intact.      Fundus: Firm, below umbilicus, nontender      Extremities: Warm, non-tender      Labs/      Blood type:  A+      Rubella: Immune      Lab Results   Component Value Date    WBC 7.33 05/28/2025    HGB 9.2 (L) 05/28/2025    HCT 28.6 (L) 05/28/2025    MCV 86 05/28/2025     (L) 05/28/2025

## 2025-05-29 NOTE — DISCHARGE SUMMARY
Discharge Summary - OB/GYN   Name: Azra Mahoney 35 y.o. female I MRN: [id number]  Unit/Bed#: -01 I Date of Admission: 2025   Date of Service: 2025 I Hospital Day: 2    ADMISSION  Patient of: [facility] [place]  Admission Date: 2025   Admitting Attending: Dr. Lewis  Admitting Diagnoses: Problem List[1]    DELIVERY  Delivery Method: , Low Transverse   Delivery Date and Time: 2025 10:50 AM  Delivery Attending: Chacorta Lewis    DISCHARGE  Discharge Date: 25  Discharge Attending: Dr. Lewis  Discharge Diagnosis:   Same, Delivered  Clinical course: Admission to Delivery  Azra Mahoney is a 35 y.o.  who was admitted at 39w3d for scheduled repeat ..    Delivery  Route of Delivery: , Low Transverse  Reason for  delivery: Prior  Prior c/s x 1    Anesthesia: Spinal,     Delivery: , Low Transverse at 2025 10:50 AM    Baby's Weight: 3884 g (8 lb 9 oz); 137    Apgar scores: 8  and 9  at 1 and 5 minutes, respectively    Clinical Course: Post-Delivery:  She had intra-op salpingectomy for sterilization.      The post delivery course was unremarkable.    On the day of discharge, the patient was ambulating, voiding spontaneously, tolerating oral intake, and hemodynamically stable. She was able to reasonably perform all ADLs. She had appropriate bowel function. Pain was well-controlled. She was discharged home on postpartum/postop day #2 without complications. Patient was instructed to follow up with her OB as an outpatient and was given appropriate warnings to call her provider with problems or concerns.    Pertinent lab findings included:   Blood type A+.     Last three Hgb values:  Lab Results   Component Value Date    HGB 9.2 (L) 2025    HGB 11.4 (L) 2025    HGB 11.1 (L) 2025        Problem-specific follow-up plans included the following:  Assessment & Plan  S/P repeat low transverse   -A/P.  35 y.o.   POD#2 s/p , Low Transverse at 39w3d of 3884 g (8 lb 9 oz) male , apgars 8 /9 .  (1) POD#2.  Delivered 2025 10:50 AM.  --> Routine postoperative care.  Anemia, postpartum  --> Home on oral iron  Hypothyroidism affecting pregnancy in third trimester  --> Continue home levothyroxine  Request for sterilization  S/p bilateral salpingectomy  --> Await final pathology report  Elevated blood pressure reading without diagnosis of hypertension  Initial blood pressure on presentation was 131/91.  All subsequent blood pressures have been normal.    Discussed with her.  Precs discussed.    Does not meet criteria for home BP monitoring.  --> Routine followup.      Discharge med list:  Contraception: Sterilization     Medication List      START taking these medications     docusate sodium 100 mg capsule; Commonly known as: COLACE; Take 1   capsule (100 mg total) by mouth 2 (two) times a day as needed for   constipation   ferrous sulfate 324 (65 Fe) mg; Take 1 tablet (324 mg total) by mouth 2   (two) times a day before meals   ibuprofen 600 mg tablet; Commonly known as: MOTRIN; Take 1 tablet (600   mg total) by mouth every 6 (six) hours   oxyCODONE 5 immediate release tablet; Commonly known as: ROXICODONE;   Take 1 tablet (5 mg total) by mouth every 4 (four) hours as needed for   moderate pain for up to 10 days Max Daily Amount: 30 mg     CONTINUE taking these medications     calcium carbonate 500 mg chewable tablet; Commonly known as: TUMS   famotidine 10 mg tablet; Commonly known as: PEPCID   levothyroxine 125 mcg tablet   PRENATAL 1 PO       Condition at discharge:   good     Disposition:   Home    Planned Readmission:   No    Discharge Statement:  I have spent a total time of 45 minutes in caring for this patient on the day of the visit/encounter. >30 minutes of time was spent on: Patient and family education, Impressions, Counseling / Coordination of care, Documenting in the medical record, and  Reviewing / ordering tests, medicine, procedures  .       [1]   Patient Active Problem List  Diagnosis    Hypothyroidism affecting pregnancy in third trimester    Obesity, Class I, BMI 30-34.9    History of  delivery    Request for sterilization    S/P repeat low transverse     Anemia, postpartum    Elevated blood pressure reading without diagnosis of hypertension

## 2025-05-29 NOTE — PLAN OF CARE
Problem: PAIN - ADULT  Goal: Verbalizes/displays adequate comfort level or baseline comfort level  Description: Interventions:  - Encourage patient to monitor pain and request assistance  - Assess pain using appropriate pain scale  - Administer analgesics as ordered based on type and severity of pain and evaluate response  - Implement non-pharmacological measures as appropriate and evaluate response  - Consider cultural and social influences on pain and pain management  - Notify physician/advanced practitioner if interventions unsuccessful or patient reports new pain  - Educate patient/family on pain management process including their role and importance of  reporting pain   - Provide non-pharmacologic/complimentary pain relief interventions  Outcome: Progressing     Problem: INFECTION - ADULT  Goal: Absence or prevention of progression during hospitalization  Description: INTERVENTIONS:  - Assess and monitor for signs and symptoms of infection  - Monitor lab/diagnostic results  - Monitor all insertion sites, i.e. indwelling lines, tubes, and drains  - Monitor endotracheal if appropriate and nasal secretions for changes in amount and color  - [place] appropriate cooling/warming therapies per order  - Administer medications as ordered  - Instruct and encourage patient and family to use good hand hygiene technique  - Identify and instruct in appropriate isolation precautions for identified infection/condition  Outcome: Progressing  Goal: Absence of fever/infection during neutropenic period  Description: INTERVENTIONS:  - Monitor WBC  - Perform strict hand hygiene  - Limit to healthy visitors only  - No plants, dried, fresh or silk flowers with philip in patient room  Outcome: Progressing     Problem: SAFETY ADULT  Goal: Patient will remain free of falls  Description: INTERVENTIONS:  - Educate patient/family on patient safety including physical limitations  - Instruct patient to call for assistance with activity   -  Consider consulting OT/PT to assist with strengthening/mobility based on AM PAC & JH-HLM score  - Consult OT/PT to assist with strengthening/mobility   - Keep Call bell within reach  - Keep bed low and locked with side rails adjusted as appropriate  - Keep care items and personal belongings within reach  - Initiate and maintain comfort rounds  - Make Fall Risk Sign visible to staff  -- Apply yellow socks and bracelet for high fall risk patients  - Consider moving patient to room near nurses station  Outcome: Progressing  Goal: Maintain or return to baseline ADL function  Description: INTERVENTIONS:  -  Assess patient's ability to carry out ADLs; assess patient's baseline for ADL function and identify physical deficits which impact ability to perform ADLs (bathing, care of mouth/teeth, toileting, grooming, dressing, etc.)  - Assess/evaluate cause of self-care deficits   - Assess range of motion  - Assess patient's mobility; develop plan if impaired  - Assess patient's need for assistive devices and provide as appropriate  - Encourage maximum independence but intervene and supervise when necessary  - Involve family in performance of ADLs  - Assess for home care needs following discharge   - Consider OT consult to assist with ADL evaluation and planning for discharge  - Provide patient education as appropriate  - Monitor functional capacity and physical performance, use of AM PAC & JH-HLM   - Monitor gait, balance and fatigue with ambulation    Outcome: Progressing  Goal: Maintains/Returns to pre admission functional level  Description: INTERVENTIONS:  - Perform AM-PAC 6 Click Basic Mobility/ Daily Activity assessment daily.  - Set and communicate daily mobility goal to care team and patient/family/caregiver.   - Collaborate with rehabilitation services on mobility goals if consulted  - Out of bed for toileting  - Record patient progress and toleration of activity level   Outcome: Progressing     Problem: DISCHARGE  PLANNING  Goal: Discharge to home or other facility with appropriate resources  Description: INTERVENTIONS:  - Identify barriers to discharge w/patient and caregiver  - Arrange for needed discharge resources and transportation as appropriate  - Identify discharge learning needs (meds, wound care, etc.)  - Arrange for interpretive services to assist at discharge as needed  - Refer to Case Management Department for coordinating discharge planning if the patient needs post-hospital services based on physician/advanced practitioner order or complex needs related to functional status, cognitive ability, or social support system  Outcome: Progressing     Problem: POSTPARTUM  Goal: Experiences normal postpartum course  Description: INTERVENTIONS:  - Monitor maternal vital signs  - Assess uterine involution and lochia  Outcome: Progressing  Goal: Appropriate maternal -  bonding  Description: INTERVENTIONS:  - Identify family support  - Assess for appropriate maternal/infant bonding   -Encourage maternal/infant bonding opportunities  - Referral to  or  as needed  Outcome: Progressing  Goal: Establishment of infant feeding pattern  Description: INTERVENTIONS:  - Assess breast/bottle feeding  - Refer to lactation as needed  Outcome: Progressing  Goal: Incision(s), wounds(s) or drain site(s) healing without S/S of infection  Description: INTERVENTIONS  - Assess and document dressing, incision, wound bed, drain sites and surrounding tissue  - Provide patient and family education    Outcome: Progressing

## 2025-05-29 NOTE — PLAN OF CARE
Problem: PAIN - ADULT  Goal: Verbalizes/displays adequate comfort level or baseline comfort level  Description: Interventions:  - Encourage patient to monitor pain and request assistance  - Assess pain using appropriate pain scale  - Administer analgesics as ordered based on type and severity of pain and evaluate response  - Implement non-pharmacological measures as appropriate and evaluate response  - Consider cultural and social influences on pain and pain management  - Notify physician/advanced practitioner if interventions unsuccessful or patient reports new pain  - Educate patient/family on pain management process including their role and importance of  reporting pain   - Provide non-pharmacologic/complimentary pain relief interventions  5/29/2025 1216 by Dyan Caban RN  Outcome: Completed  5/29/2025 0844 by Dyan Caban RN  Outcome: Progressing     Problem: INFECTION - ADULT  Goal: Absence or prevention of progression during hospitalization  Description: INTERVENTIONS:  - Assess and monitor for signs and symptoms of infection  - Monitor lab/diagnostic results  - Monitor all insertion sites, i.e. indwelling lines, tubes, and drains  - Monitor endotracheal if appropriate and nasal secretions for changes in amount and color  - [place] appropriate cooling/warming therapies per order  - Administer medications as ordered  - Instruct and encourage patient and family to use good hand hygiene technique  - Identify and instruct in appropriate isolation precautions for identified infection/condition  5/29/2025 1216 by Dyan Caban RN  Outcome: Completed  5/29/2025 0844 by Dyan Caban RN  Outcome: Progressing  Goal: Absence of fever/infection during neutropenic period  Description: INTERVENTIONS:  - Monitor WBC  - Perform strict hand hygiene  - Limit to healthy visitors only  - No plants, dried, fresh or silk flowers with philip in patient room  5/29/2025 1216 by Dyan Caban RN  Outcome:  Completed  5/29/2025 0844 by Dyan Caban RN  Outcome: Progressing     Problem: SAFETY ADULT  Goal: Patient will remain free of falls  Description: INTERVENTIONS:  - Educate patient/family on patient safety including physical limitations  - Instruct patient to call for assistance with activity   - Consider consulting OT/PT to assist with strengthening/mobility based on AM PAC & JH-HLM score  - Consult OT/PT to assist with strengthening/mobility   - Keep Call bell within reach  - Keep bed low and locked with side rails adjusted as appropriate  - Keep care items and personal belongings within reach  - Initiate and maintain comfort rounds  - Make Fall Risk Sign visible to staff  -- Apply yellow socks and bracelet for high fall risk patients  - Consider moving patient to room near nurses station  5/29/2025 1216 by Dyan Caban RN  Outcome: Completed  5/29/2025 0844 by Dyan Caban RN  Outcome: Progressing  Goal: Maintain or return to baseline ADL function  Description: INTERVENTIONS:  -  Assess patient's ability to carry out ADLs; assess patient's baseline for ADL function and identify physical deficits which impact ability to perform ADLs (bathing, care of mouth/teeth, toileting, grooming, dressing, etc.)  - Assess/evaluate cause of self-care deficits   - Assess range of motion  - Assess patient's mobility; develop plan if impaired  - Assess patient's need for assistive devices and provide as appropriate  - Encourage maximum independence but intervene and supervise when necessary  - Involve family in performance of ADLs  - Assess for home care needs following discharge   - Consider OT consult to assist with ADL evaluation and planning for discharge  - Provide patient education as appropriate  - Monitor functional capacity and physical performance, use of AM PAC & JH-HLM   - Monitor gait, balance and fatigue with ambulation    5/29/2025 1216 by Dyan Caban RN  Outcome: Completed  5/29/2025 0844 by Dyan CALIX  ASHLEE Caban  Outcome: Progressing  Goal: Maintains/Returns to pre admission functional level  Description: INTERVENTIONS:  - Perform AM-PAC 6 Click Basic Mobility/ Daily Activity assessment daily.  - Set and communicate daily mobility goal to care team and patient/family/caregiver.   - Collaborate with rehabilitation services on mobility goals if consulted  -- Out of bed for toileting  - Record patient progress and toleration of activity level   2025 by Dyan Caban RN  Outcome: Completed  2025 by Dyan Caban RN  Outcome: Progressing     Problem: DISCHARGE PLANNING  Goal: Discharge to home or other facility with appropriate resources  Description: INTERVENTIONS:  - Identify barriers to discharge w/patient and caregiver  - Arrange for needed discharge resources and transportation as appropriate  - Identify discharge learning needs (meds, wound care, etc.)  - Arrange for interpretive services to assist at discharge as needed  - Refer to Case Management Department for coordinating discharge planning if the patient needs post-hospital services based on physician/advanced practitioner order or complex needs related to functional status, cognitive ability, or social support system  2025 by Dyan Caban RN  Outcome: Completed  2025 by Dyan Caban RN  Outcome: Progressing     Problem: POSTPARTUM  Goal: Experiences normal postpartum course  Description: INTERVENTIONS:  - Monitor maternal vital signs  - Assess uterine involution and lochia  2025 by Dyan Caban RN  Outcome: Completed  2025 by Dyan Caban RN  Outcome: Progressing  Goal: Appropriate maternal -  bonding  Description: INTERVENTIONS:  - Identify family support  - Assess for appropriate maternal/infant bonding   -Encourage maternal/infant bonding opportunities  - Referral to  or  as needed  2025 by Dyan Caban RN  Outcome: Completed  2025  by Dyan M Arsh, RN  Outcome: Progressing  Goal: Establishment of infant feeding pattern  Description: INTERVENTIONS:  - Assess breast/bottle feeding  - Refer to lactation as needed  5/29/2025 1216 by Dyan Caban RN  Outcome: Completed  5/29/2025 0844 by Dyan Caban RN  Outcome: Progressing  Goal: Incision(s), wounds(s) or drain site(s) healing without S/S of infection  Description: INTERVENTIONS  - Assess and document dressing, incision, wound bed, drain sites and surrounding tissue  - Provide patient and family education    5/29/2025 1216 by Dyan Caban RN  Outcome: Completed  5/29/2025 0844 by Dyan Caban RN  Outcome: Progressing

## 2025-05-29 NOTE — CASE MANAGEMENT
Case Management Progress Note    Patient name Azra Mahoney  Location LD /LD - MRN [id number]  : 1990 Date 2025       LOS (days): 2  Geometric Mean LOS (GMLOS) (days):   Days to GMLOS:        OBJECTIVE:        Current admission status: Inpatient  Preferred Pharmacy:   [facility]/pharmacy #[id number] - [place] - [address]  [address] [place]  [place] [address]  Phone: [phone] Fax: [phone]    Primary Care Provider: No primary care provider on file.    Primary Insurance: BLUE CROSS  Secondary Insurance:     PROGRESS NOTE: Pump approved and delivered by nursing. However the flange sign pt needs for pump is not sold for [employer]. MOB leaving pump here and will let CM know tomorrow if she wants the pump or if she wants to cancel it.

## 2025-05-29 NOTE — ASSESSMENT & PLAN NOTE
-A/P.  35 y.o.  POD#2 s/p , Low Transverse at 39w3d of 3884 g (8 lb 9 oz) male , apgars 8 /9 .  (1) POD#2.  Delivered 2025 10:50 AM.  --> Routine postoperative care.

## 2025-05-29 NOTE — CASE MANAGEMENT
Case Management Progress Note    Patient name Azra Mahoney  Location LD /LD  MRN [id number]  : 1990 Date 2025       LOS (days): 2  Geometric Mean LOS (GMLOS) (days):   Days to GMLOS:        OBJECTIVE:        Current admission status: Inpatient  Preferred Pharmacy:   [facility]/pharmacy #[id number] - [place] [address]  [address]  [place] [address]  Phone: [phone] Fax: [phone]    Primary Care Provider: No primary care provider on file.    Primary Insurance: BLUE CROSS  Secondary Insurance:     PROGRESS NOTE: order placed for Zomee Z2 pump. Cm waiting for approval to deliver.

## 2025-05-29 NOTE — ASSESSMENT & PLAN NOTE
Initial blood pressure on presentation was 131/91.  All subsequent blood pressures have been normal.    Discussed with her.  Precs discussed.    Does not meet criteria for home BP monitoring.  --> Routine followup.

## 2025-05-29 NOTE — LACTATION NOTE
This note was copied from a baby's chart.  CONSULT - LACTATION  Baby Boy (Mary Mahoney 2 days male MRN: [id number]    [facility] NURSERY Room / Bed: (N)/(N) Encounter: [id number]    Maternal Information     MOTHER:  Azra Mahoney  Maternal Age: 35 y.o.  OB History: # 1 - Date: 22, Sex: Male, Weight: 1673 g (3 lb 11 oz), GA: 33w0d, Type: , Low Transverse, Apgar1: None, Apgar5: None, Living: Living, Birth Comments: Severe Preeclampsia, HELLP Syndrome, Severe IUGR    # 2 - Date: 2023, Sex: None, Weight: None, GA: None, Type: None, Apgar1: None, Apgar5: None, Living: None, Birth Comments: None    # 3 - Date: 25, Sex: Male, Weight: 3884 g (8 lb 9 oz), GA: 39w3d, Type: , Low Transverse, Apgar1: 8, Apgar5: 9, Living: Living, Birth Comments: None   Previous breast reduction surgery? No    Lactation history:   Has patient previously breast fed: Yes   How long had patient previously breast fed: 6 weeks   Previous breast feeding complications: Low milk supply (NICU admission, . Pumped q 2 hrs, getting <1oz.)     Past Surgical History:   Procedure Laterality Date     SECTION      LAPAROSCOPY  2020    Confirmed PCOS and underwent Ovarian drilling perfomed,    MOUTH SURGERY Bilateral     Removal of 7 baby teeth at age 12    AL  DELIVERY ONLY N/A 2025    Procedure:  SECTION () REPEAT;  Surgeon: Chacorta Lewis MD;  Location: [facility];  Service: Obstetrics    SALPINGECTOMY N/A 2025    Procedure: SALPINGECTOMY;  Surgeon: Chacorta Lewis MD;  Location: [facility];  Service: Obstetrics    WISDOM TOOTH EXTRACTION Bilateral        Birth information:  YOB: 2027   Time of birth: 10:50 AM   Sex: male   Delivery type: , Low Transverse   Birth Weight: 3884 g (8 lb 9 oz)   Percent of Weight Change: -7%     Gestational Age: 39w3d   [unfilled]    Reason for Consult:    Reason for Consult:  Discharge (ext) - 20 min, Breast/Nipple Pain - 5 min    Risk Factors:    Risk Factors:  (Hypothyroidism/PCOS)    Breast and nipple assessment:   Breasts/Nipples  Left Breast: Soft  Right Breast: Soft  Left Nipple: Everted, Tender  Right Nipple: Everted, Tender  Intervention: Lanolin, Hydrogel pads  Breastfeeding Progress: Not yet established, Breastfeeding well (per patient)  Reasons for not Breastfeeding:  (Mom concerned with medical obsticles)    Breast Pump:    Breast Pump  Pump: Manual, Personal (CM consult placed for App[employer]ap[employer]e Z2 breast pump.)  Pump Review/Education: Milk storage      Feeding recommendations:  breast feed on demand every 2-3 hours, watching for early feeding cues. At least 8-12 feedings in 24 hours.    Consulted with Azra to follow up and discuss discharge breastfeeding anticipation guidelines.    Azra reports that baby is breastfeeding progressively better. Patient reports that the dyad is working on a deeper latch. Patient has tender nipples, no visible blistering or bruising noted.    Hand on hand demonstration was provided on how to achieve deeper latch.  Discussed to position baby to breast level using pillow support.  Encouraged patient to gently compress the breast as if offering a sandwich with fingers and thumb in parallel with baby's lips to achieve deep latch.  Reviewed to align baby's nose to the nipple and allowing for the baby to open wide, with the baby's chin touching the breast first.  Reviewed breast compression and stimulation techniques to keep baby actively sucking at the breast.    Discussed that if it feels like baby is continuously pinching at the breast, encouraged to break the suction by putting pinky in the corner of the baby's mouth and relatching the baby.     Wet wound care reviewed. Patient was provided with lanolin and hydrogel pads. Educated that the two are not to be used in conjunction.  Patient is also encouraged to pay close attention to latch and  positioning.     Feeding plan:  Family is encouraged to breastfeed on demand, every 2-3 hours or when baby showing early feeding cues. Reviewed to offer both breasts during a feed. Reviewed to offer the breast up to 4 times in one feed.  Discussed the importance of ensuring that baby feeds 8-12x in 24 hours and not waiting over 3 hours to feed. Reiterated to watch the baby for hunger and fullness cues.    Encouraged family to monitor baby's outputs, ensuring baby is reaching goal diapers. Discussed that soiled diapers transition by changing color from black meconium to yellow/seedy by day 5.    Patient asked to be fitted for flange size. Addressed pumping needs for home. CM consult placed for Ox[employer]is Z2 breast pump.    Discussed initial engorgement time frame and reviewed engorgement comfort measures.  Discussed to continue to breastfeed on demand, not waiting too long between feeds- > 3 hours.  Recommended hand expression and/or use of manual breast pump for brief expression to relieve engorgement and discomfort if baby is not due to feed or not due to pump.   Reviewed using same comfort measures listed above to help soften the breast for baby to latch.  Encouraged use of warm compress prior to feeds to help with milk flow and ice between feeds for inflammation.     Discussed community resources for continued breastfeeding support once discharged home. Encouraged to contact with [facility] as needed.    Patient was encouraged to contact lactation for a latch assessment, continued support and/or questions that arise before discharge.    MUSA Jean 5/29/2025 10:12 AM

## 2025-05-29 NOTE — PLAN OF CARE
Problem: PAIN - ADULT  Goal: Verbalizes/displays adequate comfort level or baseline comfort level  Description: Interventions:  - Encourage patient to monitor pain and request assistance  - Assess pain using appropriate pain scale  - Administer analgesics as ordered based on type and severity of pain and evaluate response  - Implement non-pharmacological measures as appropriate and evaluate response  - Consider cultural and social influences on pain and pain management  - Notify physician/advanced practitioner if interventions unsuccessful or patient reports new pain  - Educate patient/family on pain management process including their role and importance of  reporting pain   - Provide non-pharmacologic/complimentary pain relief interventions  Outcome: Progressing     Problem: INFECTION - ADULT  Goal: Absence or prevention of progression during hospitalization  Description: INTERVENTIONS:  - Assess and monitor for signs and symptoms of infection  - Monitor lab/diagnostic results  - Monitor all insertion sites, i.e. indwelling lines, tubes, and drains  - Monitor endotracheal if appropriate and nasal secretions for changes in amount and color  - [place] appropriate cooling/warming therapies per order  - Administer medications as ordered  - Instruct and encourage patient and family to use good hand hygiene technique  - Identify and instruct in appropriate isolation precautions for identified infection/condition  Outcome: Progressing  Goal: Absence of fever/infection during neutropenic period  Description: INTERVENTIONS:  - Monitor WBC  - Perform strict hand hygiene  - Limit to healthy visitors only  - No plants, dried, fresh or silk flowers with philip in patient room  Outcome: Progressing     Problem: SAFETY ADULT  Goal: Patient will remain free of falls  Description: INTERVENTIONS:  - Educate patient/family on patient safety including physical limitations  - Instruct patient to call for assistance with activity   -  Consider consulting OT/PT to assist with strengthening/mobility based on AM PAC & JH-HLM score  - Consult OT/PT to assist with strengthening/mobility   - Keep Call bell within reach  - Keep bed low and locked with side rails adjusted as appropriate  - Keep care items and personal belongings within reach  - Initiate and maintain comfort rounds  - Make Fall Risk Sign visible to staff  - Apply yellow socks and bracelet for high fall risk patients  - Consider moving patient to room near nurses station  Outcome: Progressing  Goal: Maintain or return to baseline ADL function  Description: INTERVENTIONS:  -  Assess patient's ability to carry out ADLs; assess patient's baseline for ADL function and identify physical deficits which impact ability to perform ADLs (bathing, care of mouth/teeth, toileting, grooming, dressing, etc.)  - Assess/evaluate cause of self-care deficits   - Assess range of motion  - Assess patient's mobility; develop plan if impaired  - Assess patient's need for assistive devices and provide as appropriate  - Encourage maximum independence but intervene and supervise when necessary  - Involve family in performance of ADLs  - Assess for home care needs following discharge   - Consider OT consult to assist with ADL evaluation and planning for discharge  - Provide patient education as appropriate  - Monitor functional capacity and physical performance, use of AM PAC & JH-HLM   - Monitor gait, balance and fatigue with ambulation    Outcome: Progressing  Goal: Maintains/Returns to pre admission functional level  Description: INTERVENTIONS:  - Perform AM-PAC 6 Click Basic Mobility/ Daily Activity assessment daily.  - Set and communicate daily mobility goal to care team and patient/family/caregiver.   - Collaborate with rehabilitation services on mobility goals if consulted  - Out of bed for toileting  - Record patient progress and toleration of activity level   Outcome: Progressing     Problem: DISCHARGE  PLANNING  Goal: Discharge to home or other facility with appropriate resources  Description: INTERVENTIONS:  - Identify barriers to discharge w/patient and caregiver  - Arrange for needed discharge resources and transportation as appropriate  - Identify discharge learning needs (meds, wound care, etc.)  - Arrange for interpretive services to assist at discharge as needed  - Refer to Case Management Department for coordinating discharge planning if the patient needs post-hospital services based on physician/advanced practitioner order or complex needs related to functional status, cognitive ability, or social support system  Outcome: Progressing     Problem: POSTPARTUM  Goal: Experiences normal postpartum course  Description: INTERVENTIONS:  - Monitor maternal vital signs  - Assess uterine involution and lochia  Outcome: Progressing  Goal: Appropriate maternal -  bonding  Description: INTERVENTIONS:  - Identify family support  - Assess for appropriate maternal/infant bonding   -Encourage maternal/infant bonding opportunities  - Referral to  or  as needed  Outcome: Progressing  Goal: Establishment of infant feeding pattern  Description: INTERVENTIONS:  - Assess breast/bottle feeding  - Refer to lactation as needed  Outcome: Progressing  Goal: Incision(s), wounds(s) or drain site(s) healing without S/S of infection  Description: INTERVENTIONS  - Assess and document dressing, incision, wound bed, drain sites and surrounding tissue  - Provide patient and family education  Outcome: Progressing

## 2025-05-30 ENCOUNTER — TELEPHONE (OUTPATIENT)
Dept: OBGYN CLINIC | Facility: CLINIC | Age: 35
End: 2025-05-30

## 2025-05-30 NOTE — UTILIZATION REVIEW
"    MOTHER AND BABY DISCHARGED MAY 29    NOTIFICATION OF INPATIENT ADMISSION   MATERNITY/DELIVERY AUTHORIZATION REQUEST   SERVICING FACILITY:   [facility]  Parent Child Health - L&D, , NICU  [address] [place] [address]  Tax ID: [id number]  NPI: [id number] ATTENDING PROVIDER:  Attending Name and NPI#: Chacorta Lewis Md [[id number]]  Address: [address] [place] [address]  Phone: [phone]     ADMISSION INFORMATION:  Place of Service: Inpatient [facility]  Place of Service Code: 21  Inpatient Admission Date/Time: 25  8:26 AM  Discharge Date/Time: 2025 12:49 PM  Admitting Diagnosis Code/Description:  No admission diagnoses are documented for this encounter.     Mother: Azra Mahoney 1990 Estimated Date of Delivery: 25  Delivering clinician: Chacorta Lewis   OB History          3    Para   2    Term   1       1    AB   1    Living   2         SAB   1    IAB        Ectopic        Multiple   0    Live Births   2                Name & MRN:   Information for the patient's :  Femi Mahoney [[id number]]    Delivery Information:  Sex: male  Delivered 2025 10:50 AM by , Low Transverse; Gestational Age: 39w3d    [place] Measurements:  Weight: 8 lb 9 oz (3884 g);  Height: 20\"    APGAR 1 minute 5 minutes 10 minutes   Totals: 8 9       UTILIZATION REVIEW CONTACT:  Amina Ruiz, Utilization   Network Utilization Review Department  Phone: [phone]  Fax [phone]  Email: [email or web address]  Contact for approvals/pending authorizations, clinical reviews, and discharge.     PHYSICIAN ADVISORY SERVICES:  Medical Necessity Denial & Aptn-nb-Vrzu Review  Phone: [phone]  Fax: [phone]  Email: [email or web address]     DISCHARGE SUPPORT TEAM:  For Patients Discharge Needs & Updates  Phone: [phone] opt. 2 Fax: [phone]  Email: " [email or web address]     NOTIFICATION OF ADMISSION DISCHARGE   This is a Notification of Discharge from [facility]. Please be advised that this patient has been discharge from our facility. Below you will find the admission and discharge date and time including the patient’s disposition.   UTILIZATION REVIEW CONTACT:  Utilization Review Assistants  Network Utilization Review Department  Phone: [phone] x carefully listen to the prompts. All voicemails are confidential.  Email: [email or web address]     ADMISSION INFORMATION  PRESENTATION DATE: 5/27/2025  8:08 AM  OBERVATION ADMISSION DATE: N/A  INPATIENT ADMISSION DATE: 5/27/25  8:26 AM   DISCHARGE DATE: 5/29/2025 12:49 PM   DISPOSITION:Home/Self Care    Network Utilization Review Department  ATTENTION: Please call with any questions or concerns to [phone] and carefully listen to the prompts so that you are directed to the right person. All voicemails are confidential.   For Discharge needs, contact Care Management DC Support Team at [phone] opt. 2  Send all requests for admission clinical reviews, approved or denied determinations and any other requests to dedicated fax number below belonging to the campus where the patient is receiving treatment. List of dedicated fax numbers for the Facilities:  FACILITY NAME UR FAX NUMBER   ADMISSION DENIALS (Administrative/Medical Necessity) [phone]   DISCHARGE SUPPORT TEAM ([facility]) [phone]   PARENT CHILD HEALTH (Maternity/NICU/Pediatrics) [phone]   [facility] [phone]   [facility] [phone]   [facility] [phone]   [facility] [phone]   [facility] [phone]   [facility] [phone]   [facility] [phone]   [facility]  [place] [phone]   [facility] [phone]   [facility] [phone]   [facility] [phone]   [facility] [phone]

## 2025-06-02 ENCOUNTER — TELEPHONE (OUTPATIENT)
Dept: OBGYN CLINIC | Facility: CLINIC | Age: 35
End: 2025-06-02

## 2025-06-02 DIAGNOSIS — O99.283 HYPOTHYROIDISM AFFECTING PREGNANCY IN THIRD TRIMESTER: Primary | ICD-10-CM

## 2025-06-02 DIAGNOSIS — E03.9 HYPOTHYROIDISM AFFECTING PREGNANCY IN THIRD TRIMESTER: Primary | ICD-10-CM

## 2025-06-02 RX ORDER — LEVOTHYROXINE SODIUM 125 UG/1
125 TABLET ORAL DAILY
Qty: 90 TABLET | Refills: 0 | Status: SHIPPED | OUTPATIENT
Start: 2025-06-02

## 2025-06-02 NOTE — TELEPHONE ENCOUNTER
6/2/25 patient called to get schedule for PP appointment and is schedule but patient also needed medication refill for Levothyroxine 125, this medication was prescribed by endocrinologist but cannot get an appointment with them anytime soon, patient is asking if provider can prescribe medication, she only have 2 days left. I inform patient I would send request but it is not a guarantee that the provider will supply the refill. Patient understood.

## 2025-06-03 ENCOUNTER — TELEPHONE (OUTPATIENT)
Dept: OBGYN CLINIC | Facility: CLINIC | Age: 35
End: 2025-06-03

## 2025-06-03 NOTE — TELEPHONE ENCOUNTER
POSTPARTUM PHONE CALL ASSESSMENT    Date of Delivery: 2025  Delivering Provider: Dr. Lewis  Mode: LTCS  Delivery Notes/Complications: She was admitted at 39w3d for scheduled repeat . Post delivery course was unremarkable.     Do you still have bleeding/pain? If so, how much/how severe? Same moderate bleeding denies pain.      Regular BMs/Urination? Normal bowel and bladder function without complication.    Breastfeeding/Formula/Both? Both     How are you doing emotionally? Feeling good.    If struggling, obtain a EPDS Score:       Do you have any other questions or concerns for us or your provider? None    Have you scheduled the pediatrician appointment with pediatrician? Yes.     Do you have a postpartum visit scheduled? yes   Date scheduled: 25 Provider: Dr. Nick

## 2025-06-04 PROCEDURE — 88307 TISSUE EXAM BY PATHOLOGIST: CPT | Performed by: PATHOLOGY

## 2025-06-04 PROCEDURE — 88302 TISSUE EXAM BY PATHOLOGIST: CPT | Performed by: PATHOLOGY

## 2025-06-11 ENCOUNTER — TELEPHONE (OUTPATIENT)
Age: 35
End: 2025-06-11

## 2025-06-11 NOTE — TELEPHONE ENCOUNTER
Patient states FMLA Form was dropped off at the [place] location on 6/3. After researching the office not form has been found. Patient will upload the form to her portal once she receives another copy from employer.

## 2025-06-19 ENCOUNTER — POSTPARTUM VISIT (OUTPATIENT)
Dept: OBGYN CLINIC | Facility: CLINIC | Age: 35
End: 2025-06-19

## 2025-06-19 VITALS
SYSTOLIC BLOOD PRESSURE: 112 MMHG | DIASTOLIC BLOOD PRESSURE: 72 MMHG | HEIGHT: 66 IN | BODY MASS INDEX: 31.98 KG/M2 | WEIGHT: 199 LBS

## 2025-06-19 DIAGNOSIS — Z98.891 S/P REPEAT LOW TRANSVERSE C-SECTION: Primary | ICD-10-CM

## 2025-06-19 DIAGNOSIS — E03.9 HYPOTHYROIDISM, UNSPECIFIED TYPE: ICD-10-CM

## 2025-06-19 PROCEDURE — 99024 POSTOP FOLLOW-UP VISIT: CPT | Performed by: PHYSICIAN ASSISTANT

## 2025-06-19 NOTE — PROGRESS NOTES
Postpartum Visit:   Patient here for postpartum visit. She is 3 weeks post partum following a low transverse  section. I have fully reviewed the prenatal and intrapartum course. The delivery was at 39 gestational weeks. Outcome: LTCS.  Anesthesia: spinal.  Postpartum course has been complicated by baby having some issues with reflux.  Baby's course has been doing well with some problems : reflux, unable to put down. Baby is feeding formula.  Patient is tolerating regular diet, Bleeding thin lochia.  Bowel function is normal. Bladder function is normal. Patient is not sexually active. Contraception method is bilateral salpingectomy. Postpartum depression screening: negative  PHQ-E Flowsheet Screening      Flowsheet Row Most Recent Value   Weiner  Depression Scale:  In the Past 7 Days    I have been able to laugh and see the funny side of things. 0   I have looked forward with enjoyment to things. 0   I have blamed myself unnecessarily when things went wrong. 1   I have been anxious or worried for no good reason. 2   I have felt scared or panicky for no good reason. 2   Things have been getting on top of me. 1   I have been so unhappy that I have had difficulty sleeping. 1   I have felt sad or miserable. 1   I have been so unhappy that I have been crying. 1   The thought of harming myself has occurred to me. 0   Weiner  Depression Scale Total 9              Objective  Physical Exam  Constitutional:       Appearance: She is well-developed.   Genitourinary:      No lesions in the vagina.      No inguinal adenopathy present in the right or left side.     Vaginal bleeding (scant brown lochia) present.      No vaginal discharge, erythema or tenderness.        Right Adnexa: not tender, not full and no mass present.     Left Adnexa: not tender, not full and no mass present.     No cervical motion tenderness, discharge or lesion.      Uterus is not enlarged, tender or irregular.      No uterine  mass detected.     No urethral prolapse, tenderness or mass present.      Bladder is not tender.    HENT:      Head: Normocephalic and atraumatic.   Neck:      Thyroid: No thyromegaly.     Cardiovascular:      Rate and Rhythm: Normal rate and regular rhythm.      Heart sounds: Normal heart sounds. No murmur heard.     No friction rub. No gallop.   Pulmonary:      Effort: Pulmonary effort is normal. No respiratory distress.      Breath sounds: Normal breath sounds. No wheezing.   Abdominal:      General: There is no distension.      Palpations: Abdomen is soft. There is no mass.      Tenderness: There is no abdominal tenderness. There is no guarding or rebound.      Hernia: No hernia is present.   Lymphadenopathy:      Cervical: No cervical adenopathy.      Lower Body: No right inguinal adenopathy. No left inguinal adenopathy.     Neurological:      Mental Status: She is alert and oriented to person, place, and time.     Skin:     General: Skin is warm and dry.     Psychiatric:         Behavior: Behavior normal.             Assessment/Plan  Hypothyroidism  Establishing care with new endocrinologist.   Script for TFTs given in meantime.     S/P repeat low transverse   Avoid intercourse, tampon use, soaking in bath tub, swimming until 6 weeks postpartum.   Bilateral salpingectomy for birth control.   EPDS: 9. Reports feeling well, just some difficulty with lack of sleep and baby's reflux. Good support system. No SI/HI ideations.   Incision clean, dry and intact.   Return to office for annual exam in 3 months.

## 2025-06-23 PROBLEM — O99.283 HYPOTHYROIDISM AFFECTING PREGNANCY IN THIRD TRIMESTER: Status: RESOLVED | Noted: 2024-11-25 | Resolved: 2025-06-23

## 2025-06-23 PROBLEM — E03.9 HYPOTHYROIDISM AFFECTING PREGNANCY IN THIRD TRIMESTER: Status: RESOLVED | Noted: 2024-11-25 | Resolved: 2025-06-23

## 2025-06-23 PROBLEM — E03.9 HYPOTHYROIDISM: Status: ACTIVE | Noted: 2025-06-23

## 2025-06-23 NOTE — ASSESSMENT & PLAN NOTE
Avoid intercourse, tampon use, soaking in bath tub, swimming until 6 weeks postpartum.   Bilateral salpingectomy for birth control.   EPDS: 9. Reports feeling well, just some difficulty with lack of sleep and baby's reflux. Good support system. No SI/HI ideations.   Incision clean, dry and intact.   Return to office for annual exam in 3 months.

## 2025-08-01 ENCOUNTER — TELEPHONE (OUTPATIENT)
Dept: OBGYN CLINIC | Facility: CLINIC | Age: 35
End: 2025-08-01

## (undated) DEVICE — SKIN MARKER DUAL TIP WITH RULER CAP, FLEXIBLE RULER AND LABELS: Brand: DEVON

## (undated) DEVICE — ABDOMINAL PAD: Brand: DERMACEA

## (undated) DEVICE — 3M™ STERI-STRIP™ REINFORCED ADHESIVE SKIN CLOSURES, R1547, 1/2 IN X 4 IN (12 MM X 100 MM), 6 STRIPS/ENVELOPE: Brand: 3M™ STERI-STRIP™

## (undated) DEVICE — SUT VICRYL 0 CTX 36 IN J978H

## (undated) DEVICE — GLOVE INDICATOR PI UNDERGLOVE SZ 7.5 BLUE

## (undated) DEVICE — SUT VICRYL 4-0 PS-2 18 IN J496G

## (undated) DEVICE — 3M™ STERI-STRIP™ COMPOUND BENZOIN TINCTURE 40 BAGS/CARTON 4 CARTONS/CASE C1544: Brand: 3M™ STERI-STRIP™

## (undated) DEVICE — GLOVE PI ULTRA TOUCH SZ.7.0

## (undated) DEVICE — DECANTER: Brand: UNBRANDED

## (undated) DEVICE — TELFA NON-ADHERENT ABSORBENT DRESSING: Brand: TELFA

## (undated) DEVICE — PACK C-SECTION PBDS

## (undated) DEVICE — Device

## (undated) DEVICE — CHLORAPREP HI-LITE 26ML ORANGE